# Patient Record
Sex: FEMALE | Race: WHITE | NOT HISPANIC OR LATINO | Employment: FULL TIME | URBAN - METROPOLITAN AREA
[De-identification: names, ages, dates, MRNs, and addresses within clinical notes are randomized per-mention and may not be internally consistent; named-entity substitution may affect disease eponyms.]

---

## 2019-02-05 ENCOUNTER — HOSPITAL ENCOUNTER (EMERGENCY)
Facility: HOSPITAL | Age: 39
Discharge: HOME | End: 2019-02-05
Attending: EMERGENCY MEDICINE
Payer: OTHER MISCELLANEOUS

## 2019-02-05 VITALS
HEIGHT: 61 IN | BODY MASS INDEX: 32.1 KG/M2 | SYSTOLIC BLOOD PRESSURE: 122 MMHG | DIASTOLIC BLOOD PRESSURE: 69 MMHG | TEMPERATURE: 99 F | HEART RATE: 84 BPM | RESPIRATION RATE: 12 BRPM | OXYGEN SATURATION: 98 % | WEIGHT: 170 LBS

## 2019-02-05 DIAGNOSIS — S69.91XA HAND INJURY, RIGHT, INITIAL ENCOUNTER: ICD-10-CM

## 2019-02-05 DIAGNOSIS — T14.8XXA CONTUSION OF BONE: Primary | ICD-10-CM

## 2019-02-05 PROCEDURE — 99283 EMERGENCY DEPT VISIT LOW MDM: CPT | Mod: 25

## 2019-02-05 PROCEDURE — 25000003 PHARM REV CODE 250: Performed by: NURSE PRACTITIONER

## 2019-02-05 PROCEDURE — 29125 APPL SHORT ARM SPLINT STATIC: CPT | Mod: RT

## 2019-02-05 RX ORDER — DICLOFENAC SODIUM 50 MG/1
50 TABLET, DELAYED RELEASE ORAL EVERY 8 HOURS PRN
Qty: 30 TABLET | Refills: 0 | Status: SHIPPED | OUTPATIENT
Start: 2019-02-05 | End: 2022-09-01

## 2019-02-05 RX ORDER — LORATADINE 10 MG/1
10 TABLET ORAL DAILY
COMMUNITY

## 2019-02-05 RX ORDER — IBUPROFEN 400 MG/1
800 TABLET ORAL
Status: COMPLETED | OUTPATIENT
Start: 2019-02-05 | End: 2019-02-05

## 2019-02-05 RX ORDER — CITALOPRAM 40 MG/1
40 TABLET, FILM COATED ORAL DAILY
COMMUNITY
End: 2022-09-01

## 2019-02-05 RX ADMIN — IBUPROFEN 800 MG: 400 TABLET, FILM COATED ORAL at 09:02

## 2019-02-05 NOTE — ED PROVIDER NOTES
Encounter Date: 2/5/2019       History     Chief Complaint   Patient presents with    Hand Injury     Smashed R hand between metal cart and a shelf yesterday, continues with pain, edema.     Dakota Scanlon is a 38 year old female presenting to the ER with c/o right hand pain and swelling. She states that she slammed her hand between a cart and metal shelf yesterday. She has had pain and swelling since that time and now reports significant pain with movement of the 2-4 digits. She has taken ibuprofen for pain and was instructed to come here by her employer.           Review of patient's allergies indicates:   Allergen Reactions    Amoxicillin     Sulfa (sulfonamide antibiotics)      History reviewed. No pertinent past medical history.  History reviewed. No pertinent surgical history.  History reviewed. No pertinent family history.  Social History     Tobacco Use    Smoking status: Current Every Day Smoker     Packs/day: 1.00     Types: Cigarettes   Substance Use Topics    Alcohol use: Not on file    Drug use: Not on file     Review of Systems   Constitutional: Negative for chills and fever.   HENT: Negative for congestion, rhinorrhea and sore throat.    Eyes: Negative for pain and redness.   Respiratory: Negative for cough and shortness of breath.    Cardiovascular: Negative for chest pain and palpitations.   Gastrointestinal: Negative for abdominal pain, diarrhea and nausea.   Genitourinary: Negative for dysuria, flank pain, frequency, hematuria and urgency.   Musculoskeletal: Positive for arthralgias and joint swelling. Negative for gait problem, myalgias and neck pain.   Skin: Negative for rash.   Neurological: Negative for dizziness, light-headedness and headaches.       Physical Exam     Initial Vitals [02/05/19 0848]   BP Pulse Resp Temp SpO2   122/69 84 12 98.6 °F (37 °C) 98 %      MAP       --         Physical Exam    Constitutional: She appears well-developed and well-nourished. She is not diaphoretic.  She is active. She does not have a sickly appearance. No distress.   HENT:   Head: Normocephalic and atraumatic.   Eyes: Conjunctivae are normal.   Neck: Normal range of motion and full passive range of motion without pain.   Cardiovascular: Normal rate, regular rhythm and normal heart sounds. Exam reveals no gallop and no friction rub.    No murmur heard.  Pulmonary/Chest: Breath sounds normal. She has no wheezes. She has no rhonchi. She has no rales.   Abdominal: Soft. Bowel sounds are normal. There is no tenderness.   Musculoskeletal: Normal range of motion.        Hands:  Tenderness and swelling to right 2-4 MCP joints. Decreased ROM due to pain. Sensation intact to light touch. 5/5 strength in digits   Neurological: She is alert. Gait normal.   Skin: Skin is warm and dry. Capillary refill takes less than 2 seconds.   Psychiatric: She has a normal mood and affect.         ED Course   Procedures  Labs Reviewed - No data to display       Imaging Results    None                APC / Resident Notes:   Dakota Scanlon is a 38 year old presenting after crush injury to right hand. Decreased ROM due to pain but patient is neurovascularly intact. No evidence of fracture or dislocation on xray per independent interpretation and radiology read. Patient placed in velcro wrist splint by nursing staff for patient comfort. Referred to orthopedics for follow up and clearance to return to work. Based on my clinical evaluation, I do not appreciate any immediate, emergent, or life threatening condition or etiology that warrants additional workup today and feel that the patient can be discharged with close follow up care.                 ED Course as of Feb 05 0923   Tue Feb 05, 2019   0918 XR R hand:  No fx or dislocation. (my read)  [MR]      ED Course User Index  [MR] Nabeel Charles MD     Clinical Impression:   The primary encounter diagnosis was Contusion of bone. A diagnosis of Hand injury, right, initial encounter was  also pertinent to this visit.      Disposition:   Disposition: Discharged  Condition: Stable                        Lisbeth Church NP  02/05/19 1219

## 2019-02-18 ENCOUNTER — OFFICE VISIT (OUTPATIENT)
Dept: ORTHOPEDICS | Facility: CLINIC | Age: 39
End: 2019-02-18
Payer: OTHER MISCELLANEOUS

## 2019-02-18 VITALS
HEART RATE: 94 BPM | HEIGHT: 61 IN | SYSTOLIC BLOOD PRESSURE: 107 MMHG | DIASTOLIC BLOOD PRESSURE: 66 MMHG | WEIGHT: 170 LBS | BODY MASS INDEX: 32.1 KG/M2

## 2019-02-18 DIAGNOSIS — S60.221A CONTUSION OF RIGHT HAND, INITIAL ENCOUNTER: Primary | ICD-10-CM

## 2019-02-18 PROCEDURE — 99999 PR PBB SHADOW E&M-EST. PATIENT-LVL III: ICD-10-PCS | Mod: PBBFAC,,, | Performed by: ORTHOPAEDIC SURGERY

## 2019-02-18 PROCEDURE — 99999 PR PBB SHADOW E&M-EST. PATIENT-LVL III: CPT | Mod: PBBFAC,,, | Performed by: ORTHOPAEDIC SURGERY

## 2019-02-18 PROCEDURE — 99243 OFF/OP CNSLTJ NEW/EST LOW 30: CPT | Mod: S$GLB,,, | Performed by: ORTHOPAEDIC SURGERY

## 2019-02-18 PROCEDURE — 99243 PR OFFICE CONSULTATION,LEVEL III: ICD-10-PCS | Mod: S$GLB,,, | Performed by: ORTHOPAEDIC SURGERY

## 2019-02-18 NOTE — LETTER
February 18, 2019      Nabeel Charles MD  [address] [place] [address] [address] [place] [id number]           [facility] [place] [address]  [place] [address]  Phone: [phone]          Patient: Dakota Scanlon   MR Number: [id number]   YOB: 1980   Date of Visit: 2/18/2019       Dear Dr. Nabeel Charles:    Thank you for referring Dakota Scanlon to me for evaluation. Attached you will find relevant portions of my assessment and plan of care.    If you have questions, please do not hesitate to call me. I look forward to following Dakota Scanlon along with you.    Sincerely,    Felix Shirley MD    Enclosure  CC:  No Recipients    If you would like to receive this communication electronically, please contact [email or web address] or [phone] to request more information on [employer] access.    For providers and/or their staff who would like to refer a patient to [facility], please contact us through our one-stop-shop provider referral line, Radha Macedo, at [phone].    If you feel you have received this communication in error or would no longer like to receive these types of communications, please e-mail [email or web address]

## 2019-02-18 NOTE — PROGRESS NOTES
History reviewed. No pertinent past medical history.    History reviewed. No pertinent surgical history.    Current Outpatient Medications   Medication Sig    citalopram (CELEXA) 40 MG tablet Take 40 mg by mouth once daily.    diclofenac (VOLTAREN) 50 MG EC tablet Take 1 tablet (50 mg total) by mouth every 8 (eight) hours as needed.    loratadine (CLARITIN) 10 mg tablet Take 10 mg by mouth once daily.     No current facility-administered medications for this visit.        Review of patient's allergies indicates:   Allergen Reactions    Amoxicillin     Sulfa (sulfonamide antibiotics)        History reviewed. No pertinent family history.    Social History     Socioeconomic History    Marital status:      Spouse name: Not on file    Number of children: Not on file    Years of education: Not on file    Highest education level: Not on file   Social Needs    Financial resource strain: Not on file    Food insecurity - worry: Not on file    Food insecurity - inability: Not on file    Transportation needs - medical: Not on file    Transportation needs - non-medical: Not on file   Occupational History    Not on file   Tobacco Use    Smoking status: Current Every Day Smoker     Packs/day: 1.00     Types: Cigarettes    Smokeless tobacco: Never Used   Substance and Sexual Activity    Alcohol use: Not on file    Drug use: Not on file    Sexual activity: Not on file   Other Topics Concern    Not on file   Social History Narrative    Not on file       Chief Complaint:   Chief Complaint   Patient presents with    Right Hand - Pain       History of present illness:  This is a 38-year-old female seen for worker's compensation injury.  Patient smashed her right hand between a cart in a shelf on approximately February 4, 2018.  Patient was seen at the emergency room. X-rays were negative.  Diagnosed with a hand contusion.  Pain is much improved.  Symptoms are mild.  Pain is a 1/10.      Review of  Systems:    Constitution: Negative for chills, fever, and sweats.  Negative for unexplained weight loss.    HENT:  Negative for headaches and blurry vision.    Cardiovascular:Negative for chest pain or irregular heart beat. Negative for hypertension.    Respiratory:  Negative for cough and shortness of breath.    Gastrointestinal: Negative for abdominal pain, heartburn, melena, nausea, and vomitting.    Genitourinary:  Negative bladder incontinence and dysuria.    Musculoskeletal:  See HPI    Neurological: Negative for numbness.    Psychiatric/Behavioral: Negative for depression.  The patient is not nervous/anxious.      Endocrine: Negative for polyuria    Hematologic/Lymphatic: Negative for bleeding problem.  Does not bruise/bleed easily.    Skin: Negative for poor would healing and rash      Physical Examination:    Vital Signs:    Vitals:    02/18/19 1346   BP: 107/66   Pulse: 94       Body mass index is 32.12 kg/m².    This a well-developed, well nourished patient in no acute distress.  They are alert and oriented and cooperative to examination.  Pt. walks without an antalgic gait.      Examination of bilateral hands and wrist shows no signs of rashes or erythema. Patient has no masses ecchymosis or effusions. Patient has full range of motion of the wrist in flexion and extension as well as ulnar and radial deviation. The patient also has full range of motion of all joints in the hand. There are 2+ radial pulse and intact light touch sensation in all 5 digits. Nontender over the anatomic snuffbox.     X-rays:  X-ray of the right wrist is available for review which show no acute fractures     Assessment::  Right hand contusion    Plan:  I reviewed the finding with her today.  Patient's x-rays are normal. Symptoms are much improved.  We will release her back to work without restrictions.  No follow-up needed. Discussed with her expectations about occasional soreness and achiness for months.    This note was  created using [employer] voice recognition software that occasionally misinterpreted phrases or words.    Consult note is delivered via Epic messaging service.

## 2019-05-21 ENCOUNTER — HOSPITAL ENCOUNTER (EMERGENCY)
Facility: HOSPITAL | Age: 39
Discharge: HOME | End: 2019-05-21
Attending: EMERGENCY MEDICINE
Payer: COMMERCIAL

## 2019-05-21 ENCOUNTER — TELEPHONE (OUTPATIENT)
Dept: CARDIOLOGY | Facility: CLINIC | Age: 39
End: 2019-05-21

## 2019-05-21 VITALS
TEMPERATURE: 98 F | SYSTOLIC BLOOD PRESSURE: 107 MMHG | OXYGEN SATURATION: 100 % | WEIGHT: 170 LBS | RESPIRATION RATE: 16 BRPM | HEIGHT: 61 IN | HEART RATE: 98 BPM | BODY MASS INDEX: 32.1 KG/M2 | DIASTOLIC BLOOD PRESSURE: 58 MMHG

## 2019-05-21 DIAGNOSIS — R55 VASOVAGAL SYNCOPE: Primary | ICD-10-CM

## 2019-05-21 DIAGNOSIS — R55 SYNCOPE: ICD-10-CM

## 2019-05-21 DIAGNOSIS — R05.9 COUGH: ICD-10-CM

## 2019-05-21 LAB
ALBUMIN SERPL BCP-MCNC: 4.2 G/DL (ref 3.5–5.2)
ALP SERPL-CCNC: 70 U/L (ref 55–135)
ALT SERPL W/O P-5'-P-CCNC: 17 U/L (ref 10–44)
ANION GAP SERPL CALC-SCNC: 10 MMOL/L (ref 8–16)
AST SERPL-CCNC: 15 U/L (ref 10–40)
BASOPHILS # BLD AUTO: 0 K/UL (ref 0–0.2)
BASOPHILS NFR BLD: 0.3 % (ref 0–1.9)
BILIRUB SERPL-MCNC: 0.4 MG/DL (ref 0.1–1)
BUN SERPL-MCNC: 15 MG/DL (ref 6–20)
CALCIUM SERPL-MCNC: 10.5 MG/DL (ref 8.7–10.5)
CHLORIDE SERPL-SCNC: 105 MMOL/L (ref 95–110)
CO2 SERPL-SCNC: 22 MMOL/L (ref 23–29)
CREAT SERPL-MCNC: 0.9 MG/DL (ref 0.5–1.4)
D DIMER PPP IA.FEU-MCNC: 0.29 MG/L FEU
DIFFERENTIAL METHOD: ABNORMAL
EOSINOPHIL # BLD AUTO: 0 K/UL (ref 0–0.5)
EOSINOPHIL NFR BLD: 0.2 % (ref 0–8)
ERYTHROCYTE [DISTWIDTH] IN BLOOD BY AUTOMATED COUNT: 13.2 % (ref 11.5–14.5)
EST. GFR  (AFRICAN AMERICAN): >60 ML/MIN/1.73 M^2
EST. GFR  (NON AFRICAN AMERICAN): >60 ML/MIN/1.73 M^2
GLUCOSE SERPL-MCNC: 91 MG/DL (ref 70–110)
HCT VFR BLD AUTO: 41.5 % (ref 37–48.5)
HGB BLD-MCNC: 14 G/DL (ref 12–16)
LYMPHOCYTES # BLD AUTO: 0.6 K/UL (ref 1–4.8)
LYMPHOCYTES NFR BLD: 8.2 % (ref 18–48)
MCH RBC QN AUTO: 31 PG (ref 27–31)
MCHC RBC AUTO-ENTMCNC: 33.8 G/DL (ref 32–36)
MCV RBC AUTO: 92 FL (ref 82–98)
MONOCYTES # BLD AUTO: 0.5 K/UL (ref 0.3–1)
MONOCYTES NFR BLD: 5.8 % (ref 4–15)
NEUTROPHILS # BLD AUTO: 6.7 K/UL (ref 1.8–7.7)
NEUTROPHILS NFR BLD: 85.5 % (ref 38–73)
PLATELET # BLD AUTO: 287 K/UL (ref 150–350)
PMV BLD AUTO: 7.2 FL (ref 9.2–12.9)
POTASSIUM SERPL-SCNC: 4.3 MMOL/L (ref 3.5–5.1)
PROT SERPL-MCNC: 7.5 G/DL (ref 6–8.4)
RBC # BLD AUTO: 4.52 M/UL (ref 4–5.4)
SODIUM SERPL-SCNC: 137 MMOL/L (ref 136–145)
TROPONIN I SERPL DL<=0.01 NG/ML-MCNC: <0.006 NG/ML (ref 0–0.03)
WBC # BLD AUTO: 7.9 K/UL (ref 3.9–12.7)

## 2019-05-21 PROCEDURE — 25000242 PHARM REV CODE 250 ALT 637 W/ HCPCS: Performed by: EMERGENCY MEDICINE

## 2019-05-21 PROCEDURE — 85379 FIBRIN DEGRADATION QUANT: CPT

## 2019-05-21 PROCEDURE — 94640 AIRWAY INHALATION TREATMENT: CPT

## 2019-05-21 PROCEDURE — 80053 COMPREHEN METABOLIC PANEL: CPT

## 2019-05-21 PROCEDURE — 99285 EMERGENCY DEPT VISIT HI MDM: CPT | Mod: 25

## 2019-05-21 PROCEDURE — 93005 ELECTROCARDIOGRAM TRACING: CPT

## 2019-05-21 PROCEDURE — 25000003 PHARM REV CODE 250: Performed by: EMERGENCY MEDICINE

## 2019-05-21 PROCEDURE — 96360 HYDRATION IV INFUSION INIT: CPT

## 2019-05-21 PROCEDURE — 84484 ASSAY OF TROPONIN QUANT: CPT

## 2019-05-21 PROCEDURE — 85025 COMPLETE CBC W/AUTO DIFF WBC: CPT

## 2019-05-21 PROCEDURE — 36415 COLL VENOUS BLD VENIPUNCTURE: CPT

## 2019-05-21 RX ORDER — PREDNISONE 10 MG/1
10 TABLET ORAL DAILY
COMMUNITY
End: 2022-09-01

## 2019-05-21 RX ORDER — HYDROCODONE POLISTIREX AND CHLORPHENIRAMINE POLISTIREX 10; 8 MG/5ML; MG/5ML
5 SUSPENSION, EXTENDED RELEASE ORAL EVERY 12 HOURS PRN
Qty: 115 ML | Refills: 0 | Status: SHIPPED | OUTPATIENT
Start: 2019-05-21 | End: 2019-05-31

## 2019-05-21 RX ORDER — AZITHROMYCIN 500 MG/1
500 TABLET, FILM COATED ORAL DAILY
COMMUNITY
End: 2019-05-21

## 2019-05-21 RX ORDER — IPRATROPIUM BROMIDE AND ALBUTEROL SULFATE 2.5; .5 MG/3ML; MG/3ML
3 SOLUTION RESPIRATORY (INHALATION) EVERY 6 HOURS PRN
Qty: 25 VIAL | Refills: 2 | Status: SHIPPED | OUTPATIENT
Start: 2019-05-21 | End: 2023-02-07

## 2019-05-21 RX ORDER — SODIUM CHLORIDE 9 MG/ML
1000 INJECTION, SOLUTION INTRAVENOUS
Status: COMPLETED | OUTPATIENT
Start: 2019-05-21 | End: 2019-05-21

## 2019-05-21 RX ORDER — IPRATROPIUM BROMIDE AND ALBUTEROL SULFATE 2.5; .5 MG/3ML; MG/3ML
3 SOLUTION RESPIRATORY (INHALATION)
Status: COMPLETED | OUTPATIENT
Start: 2019-05-21 | End: 2019-05-21

## 2019-05-21 RX ADMIN — SODIUM CHLORIDE 1000 ML: 0.9 INJECTION, SOLUTION INTRAVENOUS at 11:05

## 2019-05-21 RX ADMIN — IPRATROPIUM BROMIDE AND ALBUTEROL SULFATE 3 ML: .5; 3 SOLUTION RESPIRATORY (INHALATION) at 11:05

## 2019-05-21 NOTE — ED PROVIDER NOTES
"Encounter Date: 5/21/2019    SCRIBE #1 NOTE: IJohn, am scribing for, and in the presence of, Dr. Ron Gilmore.   SCRIBE #2 NOTE: IIgnacio, am scribing for, and in the presence of, Dr. Gilmore. the EKG reading.     History     Chief Complaint   Patient presents with    Loss of Consciousness     episodes of " passing out " seen 3 times at [facility] / presently on prednisone / z pack        Time seen by provider: 10:48 AM on 05/21/2019    Dakota Scanlon is a 38 y.o. female with PMHx of Asthma who presents to the ED for evaluation after loss of consciousness. Pt states "every time she coughs real hard she hits the ground". She says these "passing out episodes" began Saturday, x3 days PTA with the most frequent episode taking place this morning. She complains of dizziness after passing out this morning. She also currently complains of SOB, headache, intermittent palpitations, right sided pain from the fall and weakness. Pt states she has low grade fever and is unsure if she's had a fever in the past few days. She also complains of diarrhea due to side effect from Amoxicillin she's been taking for a possible sinus infection. Pt confirms smoking one pack per 1.5 days.The patient denies congestion or any other symptoms at this time. No PSHx noted. Allergic to Amoxicillin and Sulfa Antibiotics.    The history is provided by the patient.     Review of patient's allergies indicates:   Allergen Reactions    Amoxicillin     Sulfa (sulfonamide antibiotics)      History reviewed. No pertinent past medical history.  History reviewed. No pertinent surgical history.  History reviewed. No pertinent family history.  Social History     Tobacco Use    Smoking status: Current Every Day Smoker     Packs/day: 1.00     Types: Cigarettes    Smokeless tobacco: Never Used   Substance Use Topics    Alcohol use: Not on file    Drug use: Not on file     Review of Systems   Constitutional: Negative for activity change and " fever.   HENT: Negative for congestion.    Respiratory: Positive for cough and shortness of breath.    Cardiovascular: Positive for palpitations (Intermittent ). Negative for leg swelling.   Gastrointestinal: Positive for diarrhea.   Genitourinary: Negative for flank pain.   Musculoskeletal: Positive for myalgias (Right sided pain due to fall). Negative for gait problem.   Neurological: Positive for dizziness (Resolved), syncope, weakness and headaches. Negative for speech difficulty.   Psychiatric/Behavioral: Negative for confusion.       Physical Exam     Initial Vitals [05/21/19 1038]   BP Pulse Resp Temp SpO2   (!) 107/58 90 18 98 °F (36.7 °C) 98 %      MAP       --         Physical Exam    Nursing note and vitals reviewed.  Constitutional: She appears well-developed and well-nourished.   HENT:   Head: Normocephalic and atraumatic.   Eyes: Conjunctivae are normal. Pupils are equal, round, and reactive to light.   Neck: Normal range of motion. Neck supple.   Cardiovascular: Normal rate, regular rhythm, normal heart sounds and normal pulses. Exam reveals no gallop and no friction rub.    No murmur heard.  Pulmonary/Chest: Effort normal and breath sounds normal. No respiratory distress. She has no wheezes. She has no rhonchi. She has no rales.   Equal, bilateral breath sounds noted without wheezing.    Abdominal: Soft. Normal appearance. She exhibits no distension. There is no tenderness.   Musculoskeletal: Normal range of motion.   Neurological: She is alert and oriented to person, place, and time.   Cranial nerves II through XII grossly intact. 5/5 motor strength to all 4 extremities. Sensation is intact. Finger-to-nose intact. Speech and cognition is normal. No focal neurologic deficit.   Skin: Skin is warm and dry. No erythema.   Psychiatric: Her mood appears anxious.         ED Course   Procedures  Labs Reviewed   COMPREHENSIVE METABOLIC PANEL - Abnormal; Notable for the following components:       Result  Value    CO2 22 (*)     All other components within normal limits   CBC W/ AUTO DIFFERENTIAL - Abnormal; Notable for the following components:    MPV 7.2 (*)     Lymph # 0.6 (*)     Gran% 85.5 (*)     Lymph% 8.2 (*)     All other components within normal limits   D DIMER, QUANTITATIVE   TROPONIN I     EKG Readings: (Independently Interpreted)   NSR @ 71 BPM with normal axis, normal intervals, and normal ST and T waves.       Imaging Results          X-Ray Chest PA And Lateral (Final result)  Result time 05/21/19 11:10:32    Final result by Nabeel Holder MD (05/21/19 11:10:32)                 Narrative:    EXAMINATION:  XR CHEST PA AND LATERAL    CLINICAL HISTORY:  Cough    TECHNIQUE:  PA and lateral views of the chest were performed.    COMPARISON:  None    FINDINGS:  Lungs are clear.Normal cardiomediastinal silhouette.Normal pulmonary vascular distribution.No pleural effusion or pneumothorax.No acute osseous abnormality.      Electronically signed by: Nabeel Holder  Date:    05/21/2019  Time:    11:10                               Medical Decision Making:   History:   Old Medical Records: I decided to obtain old medical records.  Independently Interpreted Test(s):   I have ordered and independently interpreted EKG Reading(s) - see summary below  Clinical Tests:   Lab Tests: Ordered and Reviewed  Radiological Study: Reviewed and Ordered  Medical Tests: Ordered and Reviewed  ED Management:  38-year-old female smoker with a history of COPD presents with a 3 day history of increased coughing associated with syncopal episodes during coughing.  She has no chest pain, palpitations.  EKG is normal with no dysrhythmia or evidence of ischemia.  Symptoms are suggestive of vasovagal syncope.  She is encouraged to discontinue all smoking and is given a prescription for DuoNeb.  She is encouraged to follow up with Cardiology for consideration of prolonged monitoring and possible provocative testing.       APC / Resident  Notes:   I, Dr. Ron Gilmore III, personally performed the services described in this documentation. All medical record entries made by the scribe were at my direction and in my presence.  I have reviewed the chart and agree that the record reflects my personal performance and is accurate and complete       Scribe Attestation:   Scribe #1: I performed the above scribed service and the documentation accurately describes the services I performed. I attest to the accuracy of the note.               Clinical Impression:       ICD-10-CM ICD-9-CM   1. Vasovagal syncope R55 780.2   2. Syncope R55 780.2   3. Cough R05 786.2         Disposition:   Disposition: Discharged  Condition: Stable                        Ron Gilmore III, MD  05/21/19 0022

## 2019-05-21 NOTE — TELEPHONE ENCOUNTER
----- Message from Keri Bar sent at 5/21/2019  4:00 PM CDT -----    Type:  Same Day Appointment Request    Caller is requesting a same day appointment.  Caller declined first available appointment listed below.      Name of Callerpt  When is the first available appointment?  june  Symptoms:    Hospital  Follow up /  diag     Heart  Skips  Beats  And  Pt  faints  Best Call Back Number: [phone]

## 2019-05-21 NOTE — TELEPHONE ENCOUNTER
Call placed to Ms. Scanlon in regards to message received. No answer, left message to return call.

## 2019-05-21 NOTE — TELEPHONE ENCOUNTER
Spoke with pt and she would not accept an appointment past tomorrow and did not want to be placed on a waiting list.

## 2019-05-21 NOTE — ED NOTES
Pt awake alert in no acute distress, pt reports LOC twice this morning, pt reports SOB denies chest pain or abd pain, denies n/v/d family at beside

## 2019-05-21 NOTE — TELEPHONE ENCOUNTER
----- Message from Denys Macias sent at 5/21/2019  2:52 PM CDT -----  Type:  Sooner Apoointment Request    Caller is requesting a sooner appointment.  Caller declined first available appointment listed below.  Caller will not accept being placed on the waitlist and is requesting a message be sent to doctor.    Name of Caller:  Self   When is the first available appointment?  07/03/2019  Symptoms:  Best Call Back Number:  [phone]  Additional Information:  Patient was seen at [facility],patient requesting to be seen earlier.

## 2020-01-17 RX ORDER — TIZANIDINE 4 MG/1
4 TABLET ORAL EVERY 8 HOURS
COMMUNITY

## 2020-01-17 RX ORDER — OMEPRAZOLE 40 MG/1
40 CAPSULE, DELAYED RELEASE ORAL EVERY MORNING
COMMUNITY

## 2020-01-17 RX ORDER — AMOXICILLIN 500 MG/1
500 CAPSULE ORAL
COMMUNITY
Start: 2019-05-07 | End: 2022-09-01

## 2020-01-20 ENCOUNTER — INITIAL CONSULT (OUTPATIENT)
Dept: PULMONOLOGY | Facility: CLINIC | Age: 40
End: 2020-01-20
Payer: COMMERCIAL

## 2020-01-20 VITALS
OXYGEN SATURATION: 97 % | HEIGHT: 61 IN | HEART RATE: 81 BPM | DIASTOLIC BLOOD PRESSURE: 69 MMHG | BODY MASS INDEX: 32.85 KG/M2 | SYSTOLIC BLOOD PRESSURE: 113 MMHG | WEIGHT: 174 LBS

## 2020-01-20 DIAGNOSIS — R53.83 FATIGUE, UNSPECIFIED TYPE: ICD-10-CM

## 2020-01-20 DIAGNOSIS — R06.00 DYSPNEA, UNSPECIFIED TYPE: Primary | ICD-10-CM

## 2020-01-20 DIAGNOSIS — Z72.0 TOBACCO ABUSE: ICD-10-CM

## 2020-01-20 PROCEDURE — 99244 PR OFFICE CONSULTATION,LEVEL IV: ICD-10-PCS | Mod: S$GLB,,, | Performed by: NURSE PRACTITIONER

## 2020-01-20 PROCEDURE — 99244 OFF/OP CNSLTJ NEW/EST MOD 40: CPT | Mod: S$GLB,,, | Performed by: NURSE PRACTITIONER

## 2020-01-20 RX ORDER — METOPROLOL SUCCINATE 50 MG/1
50 TABLET, EXTENDED RELEASE ORAL DAILY
COMMUNITY
Start: 2019-12-20

## 2020-01-20 RX ORDER — MIDODRINE HYDROCHLORIDE 5 MG/1
TABLET ORAL
COMMUNITY
Start: 2019-11-16 | End: 2022-11-01

## 2020-01-20 RX ORDER — COLCHICINE 0.6 MG/1
0.6 TABLET ORAL DAILY PRN
COMMUNITY
Start: 2020-01-14 | End: 2022-09-01

## 2020-01-20 RX ORDER — MIDODRINE HYDROCHLORIDE 10 MG/1
10 TABLET ORAL DAILY
COMMUNITY
Start: 2020-01-09

## 2020-01-20 RX ORDER — INDOMETHACIN 25 MG/1
CAPSULE ORAL
COMMUNITY
Start: 2020-01-14 | End: 2022-09-01

## 2020-01-20 NOTE — PROGRESS NOTES
SUBJECTIVE:    Patient ID: Dakota Scanlon is a 39 y.o. female.    Chief Complaint: Apnea (possible)    HPI     Patient here referred here today by Dr. Castro to be evaluated for sleep apnea.  She states she sleeps all day and all night. She lives tired.  She has headaches that come and go, decreased libido.  She states she wakes herself up talking in middle of night. She has a history of asthma.  She is trying to quit smoking down to a pack every 3-4 days.  She has been smoking since age of 13.    Past Medical History:   Diagnosis Date    Arthritis     Asthma     Nervous disorder      History reviewed. No pertinent surgical history.  Family History   Problem Relation Age of Onset    Hypertension Father     Diabetes Father     Heart disease Sister         Social History:   Marital Status:   Occupation:works at [employer]   Alcohol History:  reports that she drank alcohol.  Tobacco History:  reports that she has been smoking cigarettes. She has been smoking about 1.00 pack per day. She has never used smokeless tobacco.  Drug History:  reports that she does not use drugs.    Review of patient's allergies indicates:   Allergen Reactions    Amoxicillin     Sulfa (sulfonamide antibiotics)        Current Outpatient Medications   Medication Sig Dispense Refill    albuterol-ipratropium (DUO-NEB) 2.5 mg-0.5 mg/3 mL nebulizer solution Take 3 mLs by nebulization every 6 (six) hours as needed for Wheezing. 25 vial 2    citalopram (CELEXA) 40 MG tablet Take 40 mg by mouth once daily.      colchicine (COLCRYS) 0.6 mg tablet       indomethacin (INDOCIN) 25 MG capsule       loratadine (CLARITIN) 10 mg tablet Take 10 mg by mouth once daily.      metoprolol succinate (TOPROL-XL) 25 MG 24 hr tablet       midodrine (PROAMATINE) 10 MG tablet       midodrine (PROAMATINE) 5 MG Tab       amoxicillin (AMOXIL) 500 MG capsule Take 500 mg by mouth.      diclofenac (VOLTAREN) 50 MG EC tablet Take 1  "tablet (50 mg total) by mouth every 8 (eight) hours as needed. (Patient not taking: Reported on 1/20/2020) 30 tablet 0    omeprazole (PRILOSEC) 40 MG capsule Take 40 mg by mouth.      predniSONE (DELTASONE) 10 MG tablet Take 10 mg by mouth once daily.      tiZANidine (ZANAFLEX) 4 MG tablet Take 4 mg by mouth.       No current facility-administered medications for this visit.            Review of Systems  General: always fatigued, can sleep all day and all night   Eyes: Vision is good.  ENT:  No sinusitis or pharyngitis.   Heart:: chest pain and palpitations   Lungs: No cough, dyspnea varies sometimes with little exertion sometimes doing nothing  GI: GERD  : No dysuria, hesitancy, or nocturia.  Musculoskeletal: wrist hurt   Skin: No lesions or rashes.  Neuro: headaches   Lymph: legs swell when she is on her feet all day  Psych: No anxiety or depression.  Endo: weight gain of 15 pounds     OBJECTIVE:      /69 (BP Location: Right arm, Patient Position: Sitting, BP Method: Medium (Automatic))   Pulse 81   Ht 5' 1" (1.549 m)   Wt 78.9 kg (174 lb)   SpO2 97%   BMI 32.88 kg/m²     Physical Exam  GENERAL: Older patient in no distress.  HEENT: Pupils equal and reactive. Extraocular movements intact. Nose intact.    malampatti 2   Pharynx moist. Poor dentation   NECK: Supple. 15.5 inches   HEART: Regular rate and rhythm. No murmur or gallop auscultated.  LUNGS: Clear to auscultation and percussion. Lung excursion symmetrical. No change in fremitus. No adventitial noises.  ABDOMEN: Bowel sounds present. Non-tender, no masses palpated.  EXTREMITIES: Normal muscle tone and joint movement, no cyanosis or clubbing.   LYMPHATICS: No adenopathy palpated, no edema.  SKIN: Dry, intact, no lesions.   NEURO: Cranial nerves II-XII intact. Motor strength 5/5 bilaterally, upper and lower extremities.  PSYCH: Appropriate affect.    Assessment:       1. Dyspnea, unspecified type    2. Fatigue, unspecified type    3. Tobacco " abuse          Plan:       Dyspnea, unspecified type  -     Complete PFT with bronchodilator; Future    Fatigue, unspecified type  -     Home Sleep Studies; Future; Expected date: 01/20/2020    Tobacco abuse       stop smoking  PFT  Home sleep study     Follow up in about 6 weeks (around 3/2/2020).

## 2020-01-20 NOTE — PATIENT INSTRUCTIONS
How to Quit Smoking  Smoking is one of the hardest habits to break. About half of all people who have ever smoked have been able to quit. Most people who still smoke want to quit. Here are some of the best ways to stop smoking.    Keep trying  Most smokers make many attempts at quitting before they are successful. Its important not to give up.  Go cold turkey  Most former smokers quit cold turkey (all at once). Trying to cut back gradually doesn't seem to work as well, perhaps because it continues the smoking habit. Also, it is possible to inhale more while smoking fewer cigarettes. This results in the same amount of nicotine in your body.  Get support  Support programs can be a big help, especially for heavy smokers. These groups offer lectures, ways to change behavior, and peer support. Here are some ways to find a support program:  · Free national quitline: 800-QUIT-NOW (982-325-0797).  · Hospital quit-smoking programs.  · American Lung Association: (911.796.3228).  · American Cancer Society (607-906-0170).  Support at home is important too. Nonsmokers can offer praise and encouragement. If the smoker in your life finds it hard to quit, encourage them to keep trying.  Over-the-counter medicines  Nicotine replacement therapy may make quitting easier. Certain aids, such as the nicotine patch, gum, and lozenges, are available without a prescription. It is best to use these under a doctors care, though. The skin patch provides a steady supply of nicotine. Nicotine gum and lozenges give temporary bursts of low levels of nicotine. Both methods reduce the craving for cigarettes. Warning: If you have nausea, vomiting, dizziness, weakness, or a fast heartbeat, stop using these products and see your doctor.  Prescription medicines  After reviewing your smoking patterns and past attempts to quit, your doctor may offer a prescription medicine such as bupropion, varenicline, a nicotine inhaler, or nasal spray. Each has  "advantages and side effects. Your doctor can review these with you.  Health benefits of quitting  The benefits of quitting start right away and keep improving the longer you go without smoking. These benefits occur at any age.  So whether you are 17 or 70, quitting is a good decision. Some of the benefits include:  · 20 minutes: Blood pressure and pulse return to normal.  · 8 hours: Oxygen levels return to normal.  · 2 days: Ability to smell and taste begin to improve as damaged nerves regrow.  · 2 to 3 weeks: Circulation and lung function improve.  · 1 to 9 months: Coughing, congestion, and shortness of breath decrease; tiredness decreases.  · 1 year: Risk of heart attack decreases by half.  · 5 years: Risk of lung cancer decreases by half; risk of stroke becomes the same as a nonsmokers.  For more on how to quit smoking, try these online resources:   · Smokefree.gov  · "Clearing the Air" booklet from the National Cancer [place]: smokefree.gov/sites/default/files/pdf/clearing-the-air-accessible.pdf  Date Last Reviewed: 3/1/2017  © 1742-0713 [employer]. [address] [place] [address]. All rights reserved. This information is not intended as a substitute for professional medical care. Always follow your healthcare professional's instructions.     stop smoking  PFT  Home sleep study     "

## 2020-01-30 ENCOUNTER — HOSPITAL ENCOUNTER (OUTPATIENT)
Dept: PULMONOLOGY | Facility: HOSPITAL | Age: 40
Discharge: HOME | End: 2020-01-30
Attending: NURSE PRACTITIONER
Payer: COMMERCIAL

## 2020-01-30 ENCOUNTER — TELEPHONE (OUTPATIENT)
Dept: PULMONOLOGY | Facility: CLINIC | Age: 40
End: 2020-01-30

## 2020-01-30 DIAGNOSIS — R06.00 DYSPNEA, UNSPECIFIED TYPE: ICD-10-CM

## 2020-01-30 PROCEDURE — 94729 DIFFUSING CAPACITY: CPT

## 2020-01-30 PROCEDURE — 94010 BREATHING CAPACITY TEST: CPT

## 2020-01-30 PROCEDURE — 94727 GAS DIL/WSHOT DETER LNG VOL: CPT

## 2020-01-30 NOTE — TELEPHONE ENCOUNTER
PFT shows no obstruction with no change with bronchodilator, mild restriction, no diffusion defect.

## 2020-02-19 ENCOUNTER — PROCEDURE VISIT (OUTPATIENT)
Dept: SLEEP MEDICINE | Facility: HOSPITAL | Age: 40
End: 2020-02-19
Attending: NURSE PRACTITIONER
Payer: COMMERCIAL

## 2020-02-19 DIAGNOSIS — R53.83 FATIGUE, UNSPECIFIED TYPE: ICD-10-CM

## 2020-02-19 PROCEDURE — 95806 SLEEP STUDY UNATT&RESP EFFT: CPT

## 2020-02-28 ENCOUNTER — TELEPHONE (OUTPATIENT)
Dept: PULMONOLOGY | Facility: HOSPITAL | Age: 40
End: 2020-02-28

## 2020-02-28 DIAGNOSIS — G47.33 OSA (OBSTRUCTIVE SLEEP APNEA): Primary | ICD-10-CM

## 2020-02-28 RX ORDER — SELENIUM SULFIDE 2.5 MG/100ML
LOTION TOPICAL
COMMUNITY
Start: 2020-02-03 | End: 2022-09-01

## 2020-02-28 RX ORDER — BUSPIRONE HYDROCHLORIDE 10 MG/1
10 TABLET ORAL 3 TIMES DAILY
COMMUNITY
Start: 2020-02-03 | End: 2022-10-05 | Stop reason: SDUPTHER

## 2020-02-28 RX ORDER — KETOCONAZOLE 20 MG/ML
SHAMPOO, SUSPENSION TOPICAL
COMMUNITY
Start: 2020-02-03 | End: 2022-09-01

## 2020-02-28 RX ORDER — HYDROCODONE BITARTRATE AND ACETAMINOPHEN 7.5; 325 MG/1; MG/1
TABLET ORAL
COMMUNITY
Start: 2020-02-26 | End: 2022-09-01

## 2020-02-28 RX ORDER — CLINDAMYCIN HYDROCHLORIDE 150 MG/1
CAPSULE ORAL
COMMUNITY
Start: 2020-02-26 | End: 2022-09-01

## 2020-02-28 NOTE — TELEPHONE ENCOUNTER
The patient has ENA on HST.  Will order a CPAP titration given the degree of hypoxemia for this HST.

## 2020-03-02 ENCOUNTER — TELEPHONE (OUTPATIENT)
Dept: PULMONOLOGY | Facility: CLINIC | Age: 40
End: 2020-03-02

## 2020-03-02 ENCOUNTER — OFFICE VISIT (OUTPATIENT)
Dept: PULMONOLOGY | Facility: CLINIC | Age: 40
End: 2020-03-02
Payer: COMMERCIAL

## 2020-03-02 ENCOUNTER — HOSPITAL ENCOUNTER (OUTPATIENT)
Dept: RADIOLOGY | Facility: HOSPITAL | Age: 40
Discharge: HOME | End: 2020-03-02
Attending: NURSE PRACTITIONER
Payer: COMMERCIAL

## 2020-03-02 VITALS
HEART RATE: 86 BPM | DIASTOLIC BLOOD PRESSURE: 80 MMHG | BODY MASS INDEX: 32.5 KG/M2 | SYSTOLIC BLOOD PRESSURE: 120 MMHG | WEIGHT: 172 LBS | OXYGEN SATURATION: 96 %

## 2020-03-02 DIAGNOSIS — R05.9 COUGH: ICD-10-CM

## 2020-03-02 DIAGNOSIS — Z72.0 TOBACCO ABUSE: Primary | ICD-10-CM

## 2020-03-02 DIAGNOSIS — G47.33 OSA (OBSTRUCTIVE SLEEP APNEA): ICD-10-CM

## 2020-03-02 PROCEDURE — 99214 PR OFFICE/OUTPT VISIT, EST, LEVL IV, 30-39 MIN: ICD-10-PCS | Mod: S$GLB,,, | Performed by: NURSE PRACTITIONER

## 2020-03-02 PROCEDURE — 99214 OFFICE O/P EST MOD 30 MIN: CPT | Mod: S$GLB,,, | Performed by: NURSE PRACTITIONER

## 2020-03-02 PROCEDURE — 71046 X-RAY EXAM CHEST 2 VIEWS: CPT | Mod: TC

## 2020-03-02 RX ORDER — DOXYCYCLINE HYCLATE 100 MG
100 TABLET ORAL 2 TIMES DAILY
Qty: 14 TABLET | Refills: 0 | Status: SHIPPED | OUTPATIENT
Start: 2020-03-02 | End: 2022-09-01

## 2020-03-02 NOTE — PATIENT INSTRUCTIONS
How to Quit Smoking  Smoking is one of the hardest habits to break. About half of all people who have ever smoked have been able to quit. Most people who still smoke want to quit. Here are some of the best ways to stop smoking.    Keep trying  Most smokers make many attempts at quitting before they are successful. Its important not to give up.  Go cold turkey  Most former smokers quit cold turkey (all at once). Trying to cut back gradually doesn't seem to work as well, perhaps because it continues the smoking habit. Also, it is possible to inhale more while smoking fewer cigarettes. This results in the same amount of nicotine in your body.  Get support  Support programs can be a big help, especially for heavy smokers. These groups offer lectures, ways to change behavior, and peer support. Here are some ways to find a support program:  · Free national quitline: 800-QUIT-NOW (741-198-3522).  · Hospital quit-smoking programs.  · American Lung Association: (371.512.6582).  · American Cancer Society (925-102-8125).  Support at home is important too. Nonsmokers can offer praise and encouragement. If the smoker in your life finds it hard to quit, encourage them to keep trying.  Over-the-counter medicines  Nicotine replacement therapy may make quitting easier. Certain aids, such as the nicotine patch, gum, and lozenges, are available without a prescription. It is best to use these under a doctors care, though. The skin patch provides a steady supply of nicotine. Nicotine gum and lozenges give temporary bursts of low levels of nicotine. Both methods reduce the craving for cigarettes. Warning: If you have nausea, vomiting, dizziness, weakness, or a fast heartbeat, stop using these products and see your doctor.  Prescription medicines  After reviewing your smoking patterns and past attempts to quit, your doctor may offer a prescription medicine such as bupropion, varenicline, a nicotine inhaler, or nasal spray. Each has  "advantages and side effects. Your doctor can review these with you.  Health benefits of quitting  The benefits of quitting start right away and keep improving the longer you go without smoking. These benefits occur at any age.  So whether you are 17 or 70, quitting is a good decision. Some of the benefits include:  · 20 minutes: Blood pressure and pulse return to normal.  · 8 hours: Oxygen levels return to normal.  · 2 days: Ability to smell and taste begin to improve as damaged nerves regrow.  · 2 to 3 weeks: Circulation and lung function improve.  · 1 to 9 months: Coughing, congestion, and shortness of breath decrease; tiredness decreases.  · 1 year: Risk of heart attack decreases by half.  · 5 years: Risk of lung cancer decreases by half; risk of stroke becomes the same as a nonsmokers.  For more on how to quit smoking, try these online resources:   · [employer] [place]  · "Clearing the Air" booklet from the National Cancer [place]: Night Up.gov/sites/default/files/pdf/clearing-the-air-accessible.pdf  Date Last Reviewed: 3/1/2017  © 9563-5858 [employer]. [address] [place] [address]. All rights reserved. This information is not intended as a substitute for professional medical care. Always follow your healthcare professional's instructions.        How to Quit Smoking  Smoking is one of the hardest habits to break. About half of all people who have ever smoked have been able to quit. Most people who still smoke want to quit. Here are some of the best ways to stop smoking.    Keep trying  Most smokers make many attempts at quitting before they are successful. Its important not to give up.  Go cold turkey  Most former smokers quit cold turkey (all at once). Trying to cut back gradually doesn't seem to work as well, perhaps because it continues the smoking habit. Also, it is possible to inhale more while smoking fewer cigarettes. This results in the same amount of nicotine in your " body.  Get support  Support programs can be a big help, especially for heavy smokers. These groups offer lectures, ways to change behavior, and peer support. Here are some ways to find a support program:  · Free national quitline: 800-QUIT-NOW (790-136-1179).  · Hospital quit-smoking programs.  · American Lung Association: (534.799.3018).  · American Cancer Society (016-325-7747).  Support at home is important too. Nonsmokers can offer praise and encouragement. If the smoker in your life finds it hard to quit, encourage them to keep trying.  Over-the-counter medicines  Nicotine replacement therapy may make quitting easier. Certain aids, such as the nicotine patch, gum, and lozenges, are available without a prescription. It is best to use these under a doctors care, though. The skin patch provides a steady supply of nicotine. Nicotine gum and lozenges give temporary bursts of low levels of nicotine. Both methods reduce the craving for cigarettes. Warning: If you have nausea, vomiting, dizziness, weakness, or a fast heartbeat, stop using these products and see your doctor.  Prescription medicines  After reviewing your smoking patterns and past attempts to quit, your doctor may offer a prescription medicine such as bupropion, varenicline, a nicotine inhaler, or nasal spray. Each has advantages and side effects. Your doctor can review these with you.  Health benefits of quitting  The benefits of quitting start right away and keep improving the longer you go without smoking. These benefits occur at any age.  So whether you are 17 or 70, quitting is a good decision. Some of the benefits include:  · 20 minutes: Blood pressure and pulse return to normal.  · 8 hours: Oxygen levels return to normal.  · 2 days: Ability to smell and taste begin to improve as damaged nerves regrow.  · 2 to 3 weeks: Circulation and lung function improve.  · 1 to 9 months: Coughing, congestion, and shortness of breath decrease; tiredness  "decreases.  · 1 year: Risk of heart attack decreases by half.  · 5 years: Risk of lung cancer decreases by half; risk of stroke becomes the same as a nonsmokers.  For more on how to quit smoking, try these online resources:   · Smokefree.gov  · "Clearing the Air" booklet from the National Cancer [place]: smokefree.gov/sites/default/files/pdf/clearing-the-air-accessible.pdf  Date Last Reviewed: 3/1/2017  © 7312-6904 [employer]. [address] [place], [place] [address]. All rights reserved. This information is not intended as a substitute for professional medical care. Always follow your healthcare professional's instructions.        Obstructive Sleep Apnea  Obstructive sleep apnea is a condition that causes your air passages to become narrowed or blocked during sleep. As a result, breathing stops for short periods. Your body wakes up enough for breathing to begin again, though you don't remember it. The cycle of stopped breathing and brief awakenings can repeat dozens of times a night. This prevents the body from getting to the deeper stages of sleep that are needed for good rest and may cause your body's oxygen level to fall.  Signs of sleep apnea include loud snoring, noisy breathing, and gasping sounds during sleep. Daytime symptoms include waking up tired after a full night's sleep, waking up with headaches, feeling very sleepy or falling asleep during the day, and having problems with memory or concentration.  Risk factors for sleep apnea include:  · Being overweight  · Being a man, or a woman in menopause  · Smoking  · Using alcohol or sedating medicines  · Having enlarged structures in the nose or throat  Home care  Lifestyle changes that can help treat snoring and sleep apnea include the following:  · If you are overweight, lose weight. Talk to your healthcare provider about a weight-loss plan for you.  · Avoid alcohol for 3 to 4 hours before bedtime. Avoid sedating medications. Ask your " healthcare provider about the medicines you take.  · If you smoke, talk to your healthcare provider about ways to quit.  · Sleep on your side. This can help prevent gravity from pulling relaxed throat tissues into your breathing passages.  · If you have allergies or sinus problems that block your nose, ask your healthcare provider for help.  Follow-up care  Follow up with your healthcare provider, or as advised. A diagnosis of sleep apnea is made with a sleep study. Your healthcare provider can tell you more about this test.  When to seek medical advice  Sleep apnea can make you more likely to have certain health problems. These include high blood pressure, heart attack, stroke, and sexual dysfunction. If you have sleep apnea, talk to your healthcare provider about the best treatments for you.  Date Last Reviewed: 4/1/2017  © 0655-2645 [employer]. [address] [place] [address], [place] [address]. All rights reserved. This information is not intended as a substitute for professional medical care. Always follow your healthcare professional's instructions.      stop smoking  Doxycycline 100mg by mouth twice a day for a week  Eat with the antibiotic, wear sun block and take a probiotic daily  Start back on over the counter Prilosec daily  Call me if the sleep lab does not call you by end of week   Chest xray

## 2020-03-02 NOTE — PROGRESS NOTES
SUBJECTIVE:    Patient ID: Dakota Scanlon is a 39 y.o. female.    Chief Complaint: Results (sleep test an pft patient was unaware that she had to do a cpap titration )    HPI     Patient here today with complaints of a cough.  She states that her allergies have been really bad.  Her cough is worse at night, occasional green mucous production for the past 3 weeks.  She is still smoking 1/2 pack of cigarettes a day.  She is taking Claritin daily. She complains of reflux but does not take medication for it. Her PFT showed no obstruction, mild restriction, and a normal diffusion capacity.  She is taking Clindamycin for a dental infection from having teeth pulled.  Her sleep study showed ENA with hypoxemia, an inlab CPAP titration study was ordered.    Past Medical History:   Diagnosis Date    Arthritis     Asthma     Nervous disorder      No past surgical history on file.  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  Tobacco History:  reports that she has been smoking cigarettes. She has been smoking about 1.00 pack per day. She has never used smokeless tobacco.  Drug History:  reports that she does not use drugs.    Review of patient's allergies indicates:   Allergen Reactions    Amoxicillin     Sulfa (sulfonamide antibiotics)        Current Outpatient Medications   Medication Sig Dispense Refill    albuterol-ipratropium (DUO-NEB) 2.5 mg-0.5 mg/3 mL nebulizer solution Take 3 mLs by nebulization every 6 (six) hours as needed for Wheezing. 25 vial 2    busPIRone (BUSPAR) 10 MG tablet       citalopram (CELEXA) 40 MG tablet Take 40 mg by mouth once daily.      clindamycin (CLEOCIN) 150 MG capsule       ketoconazole (NIZORAL) 2 % shampoo       loratadine (CLARITIN) 10 mg tablet Take 10 mg  by mouth once daily.      metoprolol succinate (TOPROL-XL) 25 MG 24 hr tablet       midodrine (PROAMATINE) 10 MG tablet       selenium sulfide 2.5 % Lotn       amoxicillin (AMOXIL) 500 MG capsule Take 500 mg by mouth.      colchicine (COLCRYS) 0.6 mg tablet       diclofenac (VOLTAREN) 50 MG EC tablet Take 1 tablet (50 mg total) by mouth every 8 (eight) hours as needed. (Patient not taking: Reported on 1/20/2020) 30 tablet 0    doxycycline (VIBRA-TABS) 100 MG tablet Take 1 tablet (100 mg total) by mouth 2 (two) times daily. 14 tablet 0    HYDROcodone-acetaminophen (NORCO) 7.5-325 mg per tablet       indomethacin (INDOCIN) 25 MG capsule       midodrine (PROAMATINE) 5 MG Tab       omeprazole (PRILOSEC) 40 MG capsule Take 40 mg by mouth.      predniSONE (DELTASONE) 10 MG tablet Take 10 mg by mouth once daily.      tiZANidine (ZANAFLEX) 4 MG tablet Take 4 mg by mouth.       No current facility-administered medications for this visit.            Review of Systems  General: no fever,  Fatigue   Eyes: Vision is good.  ENT: had 3 teeth pulled got infected doing better   Heart:: chest pain and palpitations at times   Lungs: cough with green mucous production for 3 weeks  GI: GERD  : No dysuria, hesitancy, or nocturia.  Musculoskeletal: wrist hurt   Skin: allergic rash from dryer sheets   Neuro: headaches   Lymph: legs swell when she is on her feet all day  Psych: No anxiety or depression.  Endo: trying to lose weight     OBJECTIVE:      /80 (BP Location: Left arm, Patient Position: Sitting)   Pulse 86   Wt 78 kg (172 lb)   SpO2 96%   BMI 32.50 kg/m²     Physical Exam  GENERAL: Older patient in no distress.  HEENT: Pupils equal and reactive. Extraocular movements intact. Nose intact.     Pharynx moist. Poor dentation   NECK: Supple.   HEART: Regular rate and rhythm. No murmur or gallop auscultated.  LUNGS: Clear to auscultation and percussion. Lung excursion symmetrical. No change in fremitus. No  adventitial noises.  ABDOMEN: Bowel sounds present. Non-tender, no masses palpated.  EXTREMITIES: Normal muscle tone and joint movement, no cyanosis or clubbing.   LYMPHATICS: No adenopathy palpated, no edema.  SKIN: Dry, intact, no lesions.   NEURO: Cranial nerves II-XII intact. Motor strength 5/5 bilaterally, upper and lower extremities.  PSYCH: Appropriate affect.    Assessment:       1. Tobacco abuse    2. ENA (obstructive sleep apnea)    3. Cough          Plan:       Tobacco abuse    ENA (obstructive sleep apnea)    Cough  -     X-Ray Chest PA And Lateral; Future    Other orders  -     doxycycline (VIBRA-TABS) 100 MG tablet; Take 1 tablet (100 mg total) by mouth 2 (two) times daily.  Dispense: 14 tablet; Refill: 0       stop smoking  Doxycycline 100mg by mouth twice a day for a week  Eat with the antibiotic, wear sun block and take a probiotic daily  Start back on over the counter Prilosec daily  Call me if the sleep lab does not call you by end of week   Chest xray   Follow up in about 3 months (around 6/2/2020).

## 2020-05-05 ENCOUNTER — TELEPHONE (OUTPATIENT)
Dept: PULMONOLOGY | Facility: CLINIC | Age: 40
End: 2020-05-05

## 2020-05-05 DIAGNOSIS — Z01.818 PREOP EXAMINATION: Primary | ICD-10-CM

## 2020-05-12 ENCOUNTER — DOCUMENTATION ONLY (OUTPATIENT)
Dept: PULMONOLOGY | Facility: CLINIC | Age: 40
End: 2020-05-12

## 2020-05-12 NOTE — PROGRESS NOTES
It is my medical opinion this procedure is time sensitive. Further delay of this procedure could result in further detriment to the patient including: death from MI or CVA.  Discussed the requirement for patient to comply with strict social distancing measures from the time of this evaluation through the day of procedure. Patient understands and agrees to comply with this requirement.

## 2020-06-12 ENCOUNTER — HOSPITAL ENCOUNTER (OUTPATIENT)
Dept: PREADMISSION TESTING | Facility: HOSPITAL | Age: 40
Discharge: HOME | End: 2020-06-12
Attending: NURSE PRACTITIONER
Payer: COMMERCIAL

## 2020-06-12 DIAGNOSIS — Z01.818 PREOP EXAMINATION: ICD-10-CM

## 2020-06-12 PROCEDURE — U0003 INFECTIOUS AGENT DETECTION BY NUCLEIC ACID (DNA OR RNA); SEVERE ACUTE RESPIRATORY SYNDROME CORONAVIRUS 2 (SARS-COV-2) (CORONAVIRUS DISEASE [COVID-19]), AMPLIFIED PROBE TECHNIQUE, MAKING USE OF HIGH THROUGHPUT TECHNOLOGIES AS DESCRIBED BY CMS-2020-01-R: HCPCS

## 2020-06-13 LAB — SARS-COV-2 RNA RESP QL NAA+PROBE: NOT DETECTED

## 2020-06-15 ENCOUNTER — PROCEDURE VISIT (OUTPATIENT)
Dept: SLEEP MEDICINE | Facility: HOSPITAL | Age: 40
End: 2020-06-15
Attending: INTERNAL MEDICINE
Payer: COMMERCIAL

## 2020-06-15 DIAGNOSIS — G47.33 OSA (OBSTRUCTIVE SLEEP APNEA): ICD-10-CM

## 2020-06-15 PROCEDURE — 95811 POLYSOM 6/>YRS CPAP 4/> PARM: CPT

## 2020-06-17 ENCOUNTER — TELEPHONE (OUTPATIENT)
Dept: PULMONOLOGY | Facility: CLINIC | Age: 40
End: 2020-06-17

## 2020-06-17 DIAGNOSIS — G47.33 OSA (OBSTRUCTIVE SLEEP APNEA): Primary | ICD-10-CM

## 2020-06-17 NOTE — TELEPHONE ENCOUNTER
The CPAP titration shows she does well with 9cm CPAP with a small Resmed F20 Air Fit full face mask and heated humidity.  tried to reach patient.  No answer.  Could not leave a message.

## 2020-12-22 ENCOUNTER — HOSPITAL ENCOUNTER (EMERGENCY)
Facility: HOSPITAL | Age: 40
Discharge: HOME | End: 2020-12-22
Attending: EMERGENCY MEDICINE
Payer: COMMERCIAL

## 2020-12-22 VITALS
BODY MASS INDEX: 32.66 KG/M2 | WEIGHT: 173 LBS | SYSTOLIC BLOOD PRESSURE: 105 MMHG | RESPIRATION RATE: 18 BRPM | TEMPERATURE: 98 F | HEIGHT: 61 IN | OXYGEN SATURATION: 99 % | DIASTOLIC BLOOD PRESSURE: 67 MMHG | HEART RATE: 66 BPM

## 2020-12-22 DIAGNOSIS — R07.9 CHEST PAIN: Primary | ICD-10-CM

## 2020-12-22 LAB
ALBUMIN SERPL BCP-MCNC: 4.7 G/DL (ref 3.5–5.2)
ALP SERPL-CCNC: 75 U/L (ref 55–135)
ALT SERPL W/O P-5'-P-CCNC: 15 U/L (ref 10–44)
ANION GAP SERPL CALC-SCNC: 13 MMOL/L (ref 8–16)
AST SERPL-CCNC: 20 U/L (ref 10–40)
BASOPHILS # BLD AUTO: 0.04 K/UL (ref 0–0.2)
BASOPHILS NFR BLD: 1 % (ref 0–1.9)
BILIRUB SERPL-MCNC: 0.8 MG/DL (ref 0.1–1)
BILIRUB UR QL STRIP: NEGATIVE
BNP SERPL-MCNC: 60 PG/ML (ref 0–99)
BUN SERPL-MCNC: 12 MG/DL (ref 6–20)
CALCIUM SERPL-MCNC: 9.2 MG/DL (ref 8.7–10.5)
CHLORIDE SERPL-SCNC: 105 MMOL/L (ref 95–110)
CLARITY UR: CLEAR
CO2 SERPL-SCNC: 20 MMOL/L (ref 23–29)
COLOR UR: YELLOW
CREAT SERPL-MCNC: 0.8 MG/DL (ref 0.5–1.4)
DIFFERENTIAL METHOD: ABNORMAL
EOSINOPHIL # BLD AUTO: 0.1 K/UL (ref 0–0.5)
EOSINOPHIL NFR BLD: 2.9 % (ref 0–8)
ERYTHROCYTE [DISTWIDTH] IN BLOOD BY AUTOMATED COUNT: 12.7 % (ref 11.5–14.5)
EST. GFR  (AFRICAN AMERICAN): >60 ML/MIN/1.73 M^2
EST. GFR  (NON AFRICAN AMERICAN): >60 ML/MIN/1.73 M^2
GLUCOSE SERPL-MCNC: 89 MG/DL (ref 70–110)
GLUCOSE UR QL STRIP: NEGATIVE
HCT VFR BLD AUTO: 41 % (ref 37–48.5)
HGB BLD-MCNC: 13.7 G/DL (ref 12–16)
HGB UR QL STRIP: ABNORMAL
IMM GRANULOCYTES # BLD AUTO: 0.01 K/UL (ref 0–0.04)
IMM GRANULOCYTES NFR BLD AUTO: 0.2 % (ref 0–0.5)
KETONES UR QL STRIP: NEGATIVE
LEUKOCYTE ESTERASE UR QL STRIP: NEGATIVE
LYMPHOCYTES # BLD AUTO: 1.1 K/UL (ref 1–4.8)
LYMPHOCYTES NFR BLD: 25.8 % (ref 18–48)
MCH RBC QN AUTO: 31.1 PG (ref 27–31)
MCHC RBC AUTO-ENTMCNC: 33.4 G/DL (ref 32–36)
MCV RBC AUTO: 93 FL (ref 82–98)
MONOCYTES # BLD AUTO: 0.3 K/UL (ref 0.3–1)
MONOCYTES NFR BLD: 7.7 % (ref 4–15)
NEUTROPHILS # BLD AUTO: 2.6 K/UL (ref 1.8–7.7)
NEUTROPHILS NFR BLD: 62.4 % (ref 38–73)
NITRITE UR QL STRIP: NEGATIVE
NRBC BLD-RTO: 0 /100 WBC
PH UR STRIP: 8 [PH] (ref 5–8)
PLATELET # BLD AUTO: 276 K/UL (ref 150–350)
PMV BLD AUTO: 10 FL (ref 9.2–12.9)
POTASSIUM SERPL-SCNC: 3.2 MMOL/L (ref 3.5–5.1)
PROT SERPL-MCNC: 7.4 G/DL (ref 6–8.4)
PROT UR QL STRIP: NEGATIVE
RBC # BLD AUTO: 4.4 M/UL (ref 4–5.4)
SARS-COV-2 RDRP RESP QL NAA+PROBE: NEGATIVE
SODIUM SERPL-SCNC: 138 MMOL/L (ref 136–145)
SP GR UR STRIP: 1 (ref 1–1.03)
TROPONIN I SERPL DL<=0.01 NG/ML-MCNC: <0.03 NG/ML
TROPONIN I SERPL DL<=0.01 NG/ML-MCNC: <0.03 NG/ML
URN SPEC COLLECT METH UR: ABNORMAL
UROBILINOGEN UR STRIP-ACNC: NEGATIVE EU/DL
WBC # BLD AUTO: 4.15 K/UL (ref 3.9–12.7)

## 2020-12-22 PROCEDURE — 36415 COLL VENOUS BLD VENIPUNCTURE: CPT

## 2020-12-22 PROCEDURE — 93010 EKG 12-LEAD: ICD-10-PCS | Mod: 76,,, | Performed by: INTERNAL MEDICINE

## 2020-12-22 PROCEDURE — 93005 ELECTROCARDIOGRAM TRACING: CPT | Performed by: INTERNAL MEDICINE

## 2020-12-22 PROCEDURE — 84484 ASSAY OF TROPONIN QUANT: CPT

## 2020-12-22 PROCEDURE — 81003 URINALYSIS AUTO W/O SCOPE: CPT

## 2020-12-22 PROCEDURE — 99285 EMERGENCY DEPT VISIT HI MDM: CPT | Mod: 25

## 2020-12-22 PROCEDURE — 25000003 PHARM REV CODE 250: Performed by: EMERGENCY MEDICINE

## 2020-12-22 PROCEDURE — U0002 COVID-19 LAB TEST NON-CDC: HCPCS

## 2020-12-22 PROCEDURE — 93010 ELECTROCARDIOGRAM REPORT: CPT | Mod: ,,, | Performed by: INTERNAL MEDICINE

## 2020-12-22 PROCEDURE — 83880 ASSAY OF NATRIURETIC PEPTIDE: CPT

## 2020-12-22 PROCEDURE — 85025 COMPLETE CBC W/AUTO DIFF WBC: CPT

## 2020-12-22 PROCEDURE — 80053 COMPREHEN METABOLIC PANEL: CPT

## 2020-12-22 RX ORDER — ASPIRIN 325 MG
325 TABLET ORAL
Status: COMPLETED | OUTPATIENT
Start: 2020-12-22 | End: 2020-12-22

## 2020-12-22 RX ADMIN — NITROGLYCERIN 1 INCH: 20 OINTMENT TOPICAL at 01:12

## 2020-12-22 RX ADMIN — ASPIRIN 325 MG ORAL TABLET 325 MG: 325 PILL ORAL at 01:12

## 2020-12-22 NOTE — ED PROVIDER NOTES
Encounter Date: 12/22/2020       History     Chief Complaint   Patient presents with    Chest Pain     Pt reports symptoms began around 1100. CP, SOB, Palpitations and left arm numbness. Hx of CHF.     Palpitations    Shortness of Breath     This is a 40-year-old female past medical history of asthma, anxiety who presents emergency department chest pain.  She says that she has anterior chest pain started at 11:00 a.m. today.  She had some mild associated shortness of breath.  She says that she had some numbness and tingling to her left arm.  Nothing seems to make it better or worse.  Not associated with any nausea or vomiting.  She does report some stress and anxiety at work.  She has never had any issues with her heart in the past other than retention of fluid.  She does not have any known coronary artery disease.  She does not have any history of diabetes, hypertension, or hyperlipidemia.        Review of patient's allergies indicates:   Allergen Reactions    Amoxicillin     Sulfa (sulfonamide antibiotics)      Past Medical History:   Diagnosis Date    Arthritis     Asthma     Nervous disorder      No past surgical history on file.  Family History   Problem Relation Age of Onset    Hypertension Father     Diabetes Father     Heart disease Sister      Social History     Tobacco Use    Smoking status: Current Every Day Smoker     Packs/day: 1.00     Types: Cigarettes    Smokeless tobacco: Never Used    Tobacco comment: on the patch at this time but still smoking 4-5 cigs a day   Substance Use Topics    Alcohol use: Yes     Comment: occasionaly     Drug use: Never     Review of Systems   Constitutional: Negative for chills and fever.   HENT: Negative for sore throat.    Respiratory: Negative for shortness of breath.    Cardiovascular: Positive for chest pain and palpitations.   Gastrointestinal: Negative for nausea.   Genitourinary: Negative for dysuria and flank pain.   Musculoskeletal: Negative  for back pain.   Skin: Negative for rash.   Neurological: Negative for weakness.   Hematological: Does not bruise/bleed easily.   All other systems reviewed and are negative.      Physical Exam     Initial Vitals [12/22/20 1228]   BP Pulse Resp Temp SpO2   117/60 63 18 97.9 °F (36.6 °C) 98 %      MAP       --         Physical Exam    Nursing note and vitals reviewed.  Constitutional: She appears well-developed and well-nourished.   HENT:   Head: Normocephalic and atraumatic.   Mouth/Throat: No oropharyngeal exudate.   Eyes: Conjunctivae and EOM are normal. Pupils are equal, round, and reactive to light.   Neck: Neck supple. No tracheal deviation present.   Cardiovascular: Normal rate, regular rhythm, normal heart sounds and intact distal pulses.   No murmur heard.  Pulmonary/Chest: Breath sounds normal. No stridor. No respiratory distress. She has no wheezes. She has no rhonchi. She has no rales. She exhibits no tenderness.   Abdominal: Soft. She exhibits no distension. There is no abdominal tenderness. There is no rebound and no guarding.   Musculoskeletal: Normal range of motion. No tenderness or edema.   Neurological: She is alert and oriented to person, place, and time. She has normal strength. No cranial nerve deficit or sensory deficit.   Skin: Skin is warm and dry. Capillary refill takes less than 2 seconds. No rash noted. No erythema. No pallor.   Psychiatric: She has a normal mood and affect. Her behavior is normal. Judgment and thought content normal.         ED Course   Procedures  Labs Reviewed   CBC W/ AUTO DIFFERENTIAL - Abnormal; Notable for the following components:       Result Value    MCH 31.1 (*)     All other components within normal limits   COMPREHENSIVE METABOLIC PANEL - Abnormal; Notable for the following components:    Potassium 3.2 (*)     CO2 20 (*)     All other components within normal limits   URINALYSIS - Abnormal; Notable for the following components:    Occult Blood UA Trace (*)      All other components within normal limits   TROPONIN I   B-TYPE NATRIURETIC PEPTIDE   SARS-COV-2 RNA AMPLIFICATION, QUAL   TROPONIN I        ECG Results          EKG 12-lead (In process)  Result time 12/22/20 16:19:39    In process by Interface, Lab In [facility] (12/22/20 16:19:39)                 Narrative:    Test Reason : R07.9,    Vent. Rate : 055 BPM     Atrial Rate : 055 BPM     P-R Int : 160 ms          QRS Dur : 092 ms      QT Int : 478 ms       P-R-T Axes : 033 049 013 degrees     QTc Int : 457 ms    Sinus bradycardia with marked sinus arrythmia  Otherwise normal ECG  When compared with ECG of 22-DEC-2020 12:31,  No significant change was found    Referred By: [email or web address]   SELF           Confirmed By:                              EKG 12-lead (In process)  Result time 12/22/20 13:36:44    In process by Interface, Lab In [facility] (12/22/20 13:36:44)                 Narrative:    Test Reason : R07.9,    Vent. Rate : 061 BPM     Atrial Rate : 061 BPM     P-R Int : 154 ms          QRS Dur : 072 ms      QT Int : 466 ms       P-R-T Axes : 053 050 023 degrees     QTc Int : 469 ms    Sinus rhythm with marked sinus arrythmia  Otherwise normal ECG  When compared with ECG of 21-MAY-2019 11:01,  QT has lengthened    Referred By: [email or web address]   SELF           Confirmed By:                             Imaging Results          X-Ray Chest PA And Lateral (Final result)  Result time 12/22/20 12:56:46    Final result by James Narvaez MD (12/22/20 12:56:46)                 Narrative:    CLINICAL HISTORY:  40 years (1980) Female Chest Pain; Palpitations; Shortness of  Breath    TECHNIQUE:  PA and lateral radiograph of the chest.    COMPARISON:  Most recent radiograph from March 2, 2020    FINDINGS:  The lungs are clear. Costophrenic angles are seen without effusion. No  pneumothorax is identified. The heart is normal in size, noting a  likely prominent epicardial fat pad along the right heart border,  unchanged. The  mediastinum is within normal limits. Osseous structures  appear within normal limits. The visualized upper abdomen is  unremarkable.    IMPRESSION:  No acute cardiac or pulmonary process.                  .            Electronically Signed by FRANCO Castanon on 12/22/2020 12:59 PM                            Results for orders placed or performed during the hospital encounter of 12/22/20   CBC auto differential   Result Value Ref Range    WBC 4.15 3.90 - 12.70 K/uL    RBC 4.40 4.00 - 5.40 M/uL    Hemoglobin 13.7 12.0 - 16.0 g/dL    Hematocrit 41.0 37.0 - 48.5 %    MCV 93 82 - 98 fL    MCH 31.1 (H) 27.0 - 31.0 pg    MCHC 33.4 32.0 - 36.0 g/dL    RDW 12.7 11.5 - 14.5 %    Platelets 276 150 - 350 K/uL    MPV 10.0 9.2 - 12.9 fL    Immature Granulocytes 0.2 0.0 - 0.5 %    Gran # (ANC) 2.6 1.8 - 7.7 K/uL    Immature Grans (Abs) 0.01 0.00 - 0.04 K/uL    Lymph # 1.1 1.0 - 4.8 K/uL    Mono # 0.3 0.3 - 1.0 K/uL    Eos # 0.1 0.0 - 0.5 K/uL    Baso # 0.04 0.00 - 0.20 K/uL    nRBC 0 0 /100 WBC    Gran % 62.4 38.0 - 73.0 %    Lymph % 25.8 18.0 - 48.0 %    Mono % 7.7 4.0 - 15.0 %    Eosinophil % 2.9 0.0 - 8.0 %    Basophil % 1.0 0.0 - 1.9 %    Differential Method Automated    Comprehensive metabolic panel   Result Value Ref Range    Sodium 138 136 - 145 mmol/L    Potassium 3.2 (L) 3.5 - 5.1 mmol/L    Chloride 105 95 - 110 mmol/L    CO2 20 (L) 23 - 29 mmol/L    Glucose 89 70 - 110 mg/dL    BUN 12 6 - 20 mg/dL    Creatinine 0.8 0.5 - 1.4 mg/dL    Calcium 9.2 8.7 - 10.5 mg/dL    Total Protein 7.4 6.0 - 8.4 g/dL    Albumin 4.7 3.5 - 5.2 g/dL    Total Bilirubin 0.8 0.1 - 1.0 mg/dL    Alkaline Phosphatase 75 55 - 135 U/L    AST 20 10 - 40 U/L    ALT 15 10 - 44 U/L    Anion Gap 13 8 - 16 mmol/L    eGFR if African American >60.0 >60 mL/min/1.73 m^2    eGFR if non African American >60.0 >60 mL/min/1.73 m^2   Troponin I   Result Value Ref Range    Troponin I <0.030 <=0.040 ng/mL   Brain natriuretic peptide   Result Value Ref Range     BNP 60 0 - 99 pg/mL   Urinalysis Clean Catch   Result Value Ref Range    Specimen UA Urine, Clean Catch     Color, UA Yellow Yellow, Straw, Tania    Appearance, UA Clear Clear    pH, UA 8.0 5.0 - 8.0    Specific Gravity, UA 1.005 1.005 - 1.030    Protein, UA Negative Negative    Glucose, UA Negative Negative    Ketones, UA Negative Negative    Bilirubin (UA) Negative Negative    Occult Blood UA Trace (A) Negative    Nitrite, UA Negative Negative    Urobilinogen, UA Negative Negative EU/dL    Leukocytes, UA Negative Negative   COVID-19 Rapid Screening   Result Value Ref Range    SARS-CoV-2 RNA, Amplification, Qual Negative Negative   Troponin I   Result Value Ref Range    Troponin I <0.030 <=0.040 ng/mL     X-Ray Chest PA And Lateral   Final Result             Medical Decision Making:   ED Management:  Patient presents emergency department chest pain as described above.  She is well-appearing, nontoxic no distress.  Overall impression is nonspecific chest pain.  I do not suspect ACS,( 2 troponins and 2 ekgs negative, Low risk HEART score) do not suspect Aortic Dissection ( pain is not ripping or tearing, normal pulses 4 extremities, no focal neuro deficits, normal mediastinum on CXR, low pretest probability, Non-Marfanoid) PE ( Low Wells score, low pretest probability) Esophageal rupture ( CXR normal, no Hammans crunch) Pneumonia (CXR normal, no cough, fever) Pericarditis with tamponade (VS normal, no JVD, hypotension, murmur, ekg within normal limits) Pneumothorax (Chest X ray negative).    I had a detailed discussion with the patient and/or guardian regarding: The historical points, exam findings, and diagnostic results supporting the discharge diagnosis, lab results, pertinent radiology results, and the need for outpatient follow-up, for definitive care with a cardiologist and to return to the emergency department if symptoms worsen or persist or if there are any questions or concerns that arise at home. All  questions have been answered in detail. Strict return to Emergency Department precautions have been provided.    A dictation software program was used for this note.  Please expect some simple typographical  errors in this note.     Additional MDM:   PERC Rule:   Age is greater than or equal to 50 = 0.0  Heart Rate is greater than or equal to 100 = 0.0  SaO2 on room air < 95% = 0.0  Unilateral leg swelling = 0.0  Hemoptysis = 0.0  Recent surgery or trauma = 0.0  Prior PE or DVT =  0.0  Hormone use = 0.00  PERC Score = 0  Heart Score:    History:          Moderately suspicious.  ECG:             Normal  Age:               Less than 45 years  Risk factors: 1-2 risk factors  Troponin:       Less than or equal to normal limit  Final Score: 2                    ED Course as of Dec 23 0629   Tue Dec 22, 2020   1508 EKG 12:31 p.m. normal sinus rhythm rate of 63.  Sinus arrhythmia noted.  No ST elevation or depression.  No STEMI.    [JR]   1511 Patient reassess, says she is feeling better.  She did admit to having some stress at work on stress situations at work he is wondering if this could be causing his symptoms.  She has cardiologist who can follow-up with her.    [JR]      ED Course User Index  [JR] Asif Quiles DO            Clinical Impression:       ICD-10-CM ICD-9-CM   1. Chest pain  R07.9 786.50                          ED Disposition Condition    Discharge Stable        ED Prescriptions     None        Follow-up Information     Follow up With Specialties Details Why Contact Info Additional Information    Sera Myles MD Family Medicine In 3 days  [address]  [address]  [facility] [address]  [phone]       [facility] Emergency Medicine  If symptoms worsen [address]  [facility] [id number] [phone] 1st floor    Austin Castro MD Cardiology, INTERVENTIONAL CARDIOLOGY In 3 days  [address] [facility]  2ND FLOOR  [facility]  [facility] [address]  [phone]                                           Asif Quiles, DO  12/23/20 0629

## 2020-12-22 NOTE — ED NOTES
Chief Complaint   Patient presents with    Chest Pain       Pt reports symptoms began around 1100. CP, SOB, Palpitations and left arm numbness. Hx of CHF.     Palpitations    Shortness of Breath      This is a 40-year-old female past medical history of asthma, anxiety who presents emergency department chest pain.  She says that she has anterior chest pain started at 11:00 a.m. today.  She had some mild associated shortness of breath.  She says that she had some numbness and tingling to her left arm.  Nothing seems to make it better or worse.  Not associated with any nausea or vomiting.  She does report some stress and anxiety at work.  She has never had any issues with her heart in the past other than retention of fluid.  She does not have any known coronary artery disease.  She does not have any history of diabetes, hypertension, or hyperlipidemia.

## 2020-12-22 NOTE — Clinical Note
"Dakota Oscarivette Scanlon was seen and treated in our emergency department on 12/22/2020.  She may return to work on 12/23/2020.       If you have any questions or concerns, please don't hesitate to call.       RN    "

## 2020-12-22 NOTE — DISCHARGE INSTRUCTIONS
RETURN TO EMERGENCY DEPARTMENT WITHOUT FAIL, IF YOUR SYMPTOMS WORSEN, IF YOU GET NEW OR DIFFERENT SYMPTOMS, IF YOU ARE UNABLE TO FOLLOW UP AS DIRECTED, OR IF YOU HAVE ANY CONCERNS OR WORRIES.    Follow-up with her cardiologist for further evaluation of her symptoms.

## 2021-12-21 DIAGNOSIS — I27.22 PULMONARY HYPERTENSION DUE TO LEFT HEART DISEASE: ICD-10-CM

## 2021-12-21 DIAGNOSIS — R06.09 PLATYPNEA-ORTHODEOXIA SYNDROME: Primary | ICD-10-CM

## 2022-01-05 ENCOUNTER — HOSPITAL ENCOUNTER (OUTPATIENT)
Dept: PULMONOLOGY | Facility: HOSPITAL | Age: 42
Discharge: HOME | End: 2022-01-05
Attending: INTERNAL MEDICINE
Payer: COMMERCIAL

## 2022-01-05 ENCOUNTER — HOSPITAL ENCOUNTER (OUTPATIENT)
Dept: RADIOLOGY | Facility: HOSPITAL | Age: 42
Discharge: HOME | End: 2022-01-05
Attending: INTERNAL MEDICINE
Payer: COMMERCIAL

## 2022-01-05 DIAGNOSIS — I27.22 PULMONARY HYPERTENSION DUE TO LEFT HEART DISEASE: ICD-10-CM

## 2022-01-05 DIAGNOSIS — R06.09 PLATYPNEA-ORTHODEOXIA SYNDROME: ICD-10-CM

## 2022-01-05 PROCEDURE — 94727 GAS DIL/WSHOT DETER LNG VOL: CPT

## 2022-01-05 PROCEDURE — 94729 DIFFUSING CAPACITY: CPT

## 2022-01-05 PROCEDURE — 94060 EVALUATION OF WHEEZING: CPT

## 2022-01-05 PROCEDURE — 71250 CT THORAX DX C-: CPT | Mod: TC

## 2022-01-05 PROCEDURE — 94010 BREATHING CAPACITY TEST: CPT

## 2022-04-04 ENCOUNTER — HOSPITAL ENCOUNTER (EMERGENCY)
Facility: HOSPITAL | Age: 42
Discharge: HOME | End: 2022-04-04
Attending: EMERGENCY MEDICINE
Payer: COMMERCIAL

## 2022-04-04 VITALS
HEIGHT: 61 IN | WEIGHT: 194 LBS | BODY MASS INDEX: 36.63 KG/M2 | TEMPERATURE: 99 F | SYSTOLIC BLOOD PRESSURE: 105 MMHG | DIASTOLIC BLOOD PRESSURE: 65 MMHG | HEART RATE: 74 BPM | RESPIRATION RATE: 17 BRPM | OXYGEN SATURATION: 95 %

## 2022-04-04 DIAGNOSIS — R00.2 PALPITATIONS: Primary | ICD-10-CM

## 2022-04-04 DIAGNOSIS — R07.9 CHEST PAIN: ICD-10-CM

## 2022-04-04 LAB
ALBUMIN SERPL BCP-MCNC: 4.5 G/DL (ref 3.5–5.2)
ALP SERPL-CCNC: 66 U/L (ref 55–135)
ALT SERPL W/O P-5'-P-CCNC: 15 U/L (ref 10–44)
ANION GAP SERPL CALC-SCNC: 13 MMOL/L (ref 8–16)
AST SERPL-CCNC: 27 U/L (ref 10–40)
B-HCG UR QL: NEGATIVE
BASOPHILS # BLD AUTO: 0.05 K/UL (ref 0–0.2)
BASOPHILS NFR BLD: 0.7 % (ref 0–1.9)
BILIRUB SERPL-MCNC: 1 MG/DL (ref 0.1–1)
BNP SERPL-MCNC: 23 PG/ML (ref 0–99)
BUN SERPL-MCNC: 15 MG/DL (ref 6–20)
CALCIUM SERPL-MCNC: 9.5 MG/DL (ref 8.7–10.5)
CHLORIDE SERPL-SCNC: 105 MMOL/L (ref 95–110)
CO2 SERPL-SCNC: 20 MMOL/L (ref 23–29)
CREAT SERPL-MCNC: 0.8 MG/DL (ref 0.5–1.4)
CTP QC/QA: YES
D DIMER PPP IA.FEU-MCNC: 0.46 UG/ML FEU
DIFFERENTIAL METHOD: ABNORMAL
EOSINOPHIL # BLD AUTO: 0.1 K/UL (ref 0–0.5)
EOSINOPHIL NFR BLD: 1.4 % (ref 0–8)
ERYTHROCYTE [DISTWIDTH] IN BLOOD BY AUTOMATED COUNT: 13.1 % (ref 11.5–14.5)
EST. GFR  (AFRICAN AMERICAN): >60 ML/MIN/1.73 M^2
EST. GFR  (NON AFRICAN AMERICAN): >60 ML/MIN/1.73 M^2
GLUCOSE SERPL-MCNC: 95 MG/DL (ref 70–110)
HCT VFR BLD AUTO: 40.5 % (ref 37–48.5)
HGB BLD-MCNC: 13.5 G/DL (ref 12–16)
IMM GRANULOCYTES # BLD AUTO: 0.02 K/UL (ref 0–0.04)
IMM GRANULOCYTES NFR BLD AUTO: 0.3 % (ref 0–0.5)
LYMPHOCYTES # BLD AUTO: 1.2 K/UL (ref 1–4.8)
LYMPHOCYTES NFR BLD: 16.3 % (ref 18–48)
MCH RBC QN AUTO: 30.8 PG (ref 27–31)
MCHC RBC AUTO-ENTMCNC: 33.3 G/DL (ref 32–36)
MCV RBC AUTO: 93 FL (ref 82–98)
MONOCYTES # BLD AUTO: 0.4 K/UL (ref 0.3–1)
MONOCYTES NFR BLD: 6.1 % (ref 4–15)
NEUTROPHILS # BLD AUTO: 5.4 K/UL (ref 1.8–7.7)
NEUTROPHILS NFR BLD: 75.2 % (ref 38–73)
NRBC BLD-RTO: 0 /100 WBC
PLATELET # BLD AUTO: 232 K/UL (ref 150–450)
PMV BLD AUTO: 9.8 FL (ref 9.2–12.9)
POTASSIUM SERPL-SCNC: 4.8 MMOL/L (ref 3.5–5.1)
PROT SERPL-MCNC: 7.6 G/DL (ref 6–8.4)
RBC # BLD AUTO: 4.38 M/UL (ref 4–5.4)
SODIUM SERPL-SCNC: 138 MMOL/L (ref 136–145)
TROPONIN I SERPL DL<=0.01 NG/ML-MCNC: <0.03 NG/ML
TROPONIN I SERPL DL<=0.01 NG/ML-MCNC: <0.03 NG/ML
TSH SERPL DL<=0.005 MIU/L-ACNC: 2.04 UIU/ML (ref 0.34–5.6)
WBC # BLD AUTO: 7.16 K/UL (ref 3.9–12.7)

## 2022-04-04 PROCEDURE — 84443 ASSAY THYROID STIM HORMONE: CPT | Performed by: NURSE PRACTITIONER

## 2022-04-04 PROCEDURE — 85379 FIBRIN DEGRADATION QUANT: CPT | Performed by: NURSE PRACTITIONER

## 2022-04-04 PROCEDURE — 99284 EMERGENCY DEPT VISIT MOD MDM: CPT | Mod: 25

## 2022-04-04 PROCEDURE — 85025 COMPLETE CBC W/AUTO DIFF WBC: CPT | Performed by: NURSE PRACTITIONER

## 2022-04-04 PROCEDURE — 93010 EKG 12-LEAD: ICD-10-PCS | Mod: ,,, | Performed by: GENERAL PRACTICE

## 2022-04-04 PROCEDURE — 83880 ASSAY OF NATRIURETIC PEPTIDE: CPT | Performed by: NURSE PRACTITIONER

## 2022-04-04 PROCEDURE — 84484 ASSAY OF TROPONIN QUANT: CPT | Performed by: NURSE PRACTITIONER

## 2022-04-04 PROCEDURE — 80053 COMPREHEN METABOLIC PANEL: CPT | Performed by: NURSE PRACTITIONER

## 2022-04-04 PROCEDURE — 36415 COLL VENOUS BLD VENIPUNCTURE: CPT | Performed by: NURSE PRACTITIONER

## 2022-04-04 PROCEDURE — 93005 ELECTROCARDIOGRAM TRACING: CPT | Performed by: GENERAL PRACTICE

## 2022-04-04 PROCEDURE — 81025 URINE PREGNANCY TEST: CPT | Performed by: NURSE PRACTITIONER

## 2022-04-04 PROCEDURE — 93010 ELECTROCARDIOGRAM REPORT: CPT | Mod: ,,, | Performed by: GENERAL PRACTICE

## 2022-04-04 RX ORDER — FLUTICASONE FUROATE AND VILANTEROL TRIFENATATE 100; 25 UG/1; UG/1
1 POWDER RESPIRATORY (INHALATION) DAILY
COMMUNITY
End: 2022-09-01

## 2022-04-04 RX ORDER — MIDODRINE HYDROCHLORIDE 10 MG/1
10 TABLET ORAL 2 TIMES DAILY
COMMUNITY
End: 2022-11-01 | Stop reason: SDUPTHER

## 2022-04-04 RX ORDER — SILDENAFIL CITRATE 20 MG/1
20 TABLET ORAL 3 TIMES DAILY
COMMUNITY

## 2022-04-04 RX ORDER — SERTRALINE HYDROCHLORIDE 50 MG/1
50 TABLET, FILM COATED ORAL DAILY
COMMUNITY
End: 2022-09-01

## 2022-04-04 RX ORDER — MONTELUKAST SODIUM 10 MG/1
10 TABLET ORAL NIGHTLY
COMMUNITY
End: 2022-09-01

## 2022-04-04 NOTE — ED PROVIDER NOTES
Encounter Date: 4/4/2022       History     Chief Complaint   Patient presents with    Palpitations     THIS AM, 150 PER PT    Nausea     Dakota Scanlon is a 41 year old female with pmh asthma, paroxysmal atrial fibrillation, nervous disorder presenting to the ED with c/o palpitations, chest pain, and SOB. 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 Her watch indicated a heart rate of 150 and she states these symptoms lasted approximately 30 minutes and resolved prior to arrival in ED. She began taking zoloft 3 days ago. She denies any symptoms at this time. She is under the care of cardiology with Dr. Castro.         Review of patient's allergies indicates:   Allergen Reactions    Amoxicillin     Sulfa (sulfonamide antibiotics)      Past Medical History:   Diagnosis Date    Arthritis     Asthma     Nervous disorder     Paroxysmal atrial fibrillation      No past surgical history on file.  Family History   Problem Relation Age of Onset    Hypertension Father     Diabetes Father     Heart disease Sister      Social History     Tobacco Use    Smoking status: Current Every Day Smoker     Packs/day: 1.00     Types: Cigarettes    Smokeless tobacco: Never Used    Tobacco comment: on the patch at this time but still smoking 4-5 cigs a day   Substance Use Topics    Alcohol use: Yes     Comment: occasionaly     Drug use: Never     Review of Systems   Constitutional: Negative for fever.   HENT: Negative for congestion and sore throat.    Respiratory: Positive for shortness of breath.    Cardiovascular: Positive for chest pain and palpitations.   Gastrointestinal: Negative for diarrhea, nausea and vomiting.   Genitourinary: Negative for dysuria.   Musculoskeletal: Negative for back pain.   Skin: Negative for rash.   Neurological: Negative for dizziness, weakness and headaches.   Hematological: Does not bruise/bleed easily.        Physical Exam     Initial Vitals [04/04/22 0956]   BP Pulse Resp Temp SpO2   (!) 124/59 83 20 98.5 °F (36.9 °C) 97 %      MAP       --         Physical Exam    Nursing note and vitals reviewed.  Constitutional: She appears well-developed and well-nourished. She is not diaphoretic. No distress.   HENT:   Head: Normocephalic and atraumatic.   Mouth/Throat: Oropharynx is clear and moist.   Eyes: Conjunctivae are normal.   Neck: Neck supple.   Cardiovascular: Normal rate, regular rhythm, normal heart sounds and intact distal pulses. Exam reveals no gallop and no friction rub.    No murmur heard.  Pulmonary/Chest: Breath sounds normal. No respiratory distress. She has no wheezes. She has no rhonchi. She has no rales.   Abdominal: Abdomen is soft. She exhibits no distension. There is no abdominal tenderness.   Musculoskeletal:         General: Normal range of motion.      Cervical back: Neck supple.     Neurological: She is alert and oriented to person, place, and time.   Skin: No rash noted. No erythema.   Psychiatric: Her speech is normal.         ED Course   Procedures  Labs Reviewed   CBC W/ AUTO DIFFERENTIAL - Abnormal; Notable for the following components:       Result Value    Gran % 75.2 (*)     Lymph % 16.3 (*)     All other components within normal limits   COMPREHENSIVE METABOLIC PANEL - Abnormal; Notable for the following components:    CO2 20 (*)     All other components within normal limits   TROPONIN I   B-TYPE NATRIURETIC PEPTIDE   TSH   B-TYPE NATRIURETIC PEPTIDE   TSH   TROPONIN I   D DIMER, QUANTITATIVE   POCT URINE PREGNANCY        ECG Results          EKG 12-lead (In process)  Result time 04/04/22 10:09:42    In process by Interface, Lab In [facility] (04/04/22 10:09:42)                 Narrative:    Test Reason : R00.2,    Vent. Rate : 073 BPM     Atrial Rate : 073 BPM     P-R Int : 140 ms          QRS Dur : 072 ms      QT Int : 408 ms       P-R-T Axes : 054 046 018 degrees     QTc Int : 449  ms    Normal sinus rhythm  Normal ECG  When compared with ECG of 22-DEC-2020 16:08,  No significant change was found    Referred By: [email or web address]   SELF           Confirmed By:                             Imaging Results          X-Ray Chest AP Portable (Final result)  Result time 04/04/22 10:58:04    Final result by Ave Galan MD (04/04/22 10:58:04)                 Narrative:    XR CHEST 1 VIEW    CLINICAL HISTORY:  41 years Female palpitations, nausea    COMPARISON: 12/22/2020    FINDINGS: Cardiomediastinal silhouette is within normal limits. Lungs are normally expanded with no airspace consolidation. No pleural effusion or pneumothorax. No acute osseous abnormality.    IMPRESSION: No acute pulmonary process.    Electronically signed by:  Ave Galan MD  4/4/2022 10:58 AM CDT Workstation: [id number]                               Medications - No data to display     Additional MDM:   Heart Score:    History:          Moderately suspicious.  ECG:             Normal  Age:               Less than 45 years  Risk factors: 1-2 risk factors  Troponin:       Less than or equal to normal limit  Final Score: 2        APC / Resident Notes:   The patient has a heart score of 2 with serial troponins both negative. She had no tachycardia, chest pain, or SOB while in the ED. D-dimer is negative and I do not suspect PE. She is under the care of Dr. Castro with cardiology and is able to follow up with him for further workup. No evidence of pneumothorax or pneumonia on CXR. No need for admission at this time for emergent cardiology evaluation. I discussed the case with Dr. Neff who agrees with plan of care and discharge of patient. Strict ED return precautions for any worsening symtpoms discussed and patient verbalized understanding. Based on my clinical evaluation, I do not appreciate any immediate, emergent, or life threatening condition or etiology that warrants additional workup today and feel that the patient can be  discharged with close follow up care.                    Clinical Impression:   Final diagnoses:  [R00.2] Palpitations (Primary)  [R07.9] Chest pain          ED Disposition Condition    Discharge Stable        ED Prescriptions     None        Follow-up Information     Follow up With Specialties Details Why Contact Info Additional Information    Pending sale to [facility] - Emergency Dept Emergency Medicine  As needed, If symptoms worsen [address] [facility] [id number]  [phone] 1st floor    Austin Castro MD Cardiology, Interventional Cardiology Schedule an appointment as soon as possible for a visit   [address]  [address]  [facility] [address]  [phone]              Lisbeth Church NP  04/04/22 3650

## 2022-04-04 NOTE — Clinical Note
"Dakota Oscarivette Scanlon was seen and treated in our emergency department on 4/4/2022.  She may return to work on 04/06/2022.       If you have any questions or concerns, please don't hesitate to call.       RN    "

## 2022-06-28 ENCOUNTER — HOSPITAL ENCOUNTER (EMERGENCY)
Facility: HOSPITAL | Age: 42
Discharge: HOME | End: 2022-06-28
Attending: EMERGENCY MEDICINE
Payer: COMMERCIAL

## 2022-06-28 VITALS
WEIGHT: 190 LBS | OXYGEN SATURATION: 99 % | TEMPERATURE: 98 F | HEIGHT: 61 IN | BODY MASS INDEX: 35.87 KG/M2 | RESPIRATION RATE: 18 BRPM | DIASTOLIC BLOOD PRESSURE: 77 MMHG | SYSTOLIC BLOOD PRESSURE: 133 MMHG | HEART RATE: 53 BPM

## 2022-06-28 DIAGNOSIS — R10.30 LOWER ABDOMINAL PAIN: Primary | ICD-10-CM

## 2022-06-28 LAB
ALBUMIN SERPL BCP-MCNC: 4 G/DL (ref 3.5–5.2)
ALP SERPL-CCNC: 74 U/L (ref 55–135)
ALT SERPL W/O P-5'-P-CCNC: 16 U/L (ref 10–44)
ANION GAP SERPL CALC-SCNC: 11 MMOL/L (ref 8–16)
AST SERPL-CCNC: 16 U/L (ref 10–40)
B-HCG UR QL: NEGATIVE
BACTERIA #/AREA URNS HPF: ABNORMAL /HPF
BASOPHILS # BLD AUTO: 0.03 K/UL (ref 0–0.2)
BASOPHILS NFR BLD: 0.7 % (ref 0–1.9)
BILIRUB SERPL-MCNC: 0.3 MG/DL (ref 0.1–1)
BILIRUB UR QL STRIP: NEGATIVE
BUN SERPL-MCNC: 13 MG/DL (ref 6–20)
CALCIUM SERPL-MCNC: 9.5 MG/DL (ref 8.7–10.5)
CHLORIDE SERPL-SCNC: 110 MMOL/L (ref 95–110)
CLARITY UR: CLEAR
CO2 SERPL-SCNC: 18 MMOL/L (ref 23–29)
COLOR UR: YELLOW
CREAT SERPL-MCNC: 0.8 MG/DL (ref 0.5–1.4)
DIFFERENTIAL METHOD: ABNORMAL
EOSINOPHIL # BLD AUTO: 0.1 K/UL (ref 0–0.5)
EOSINOPHIL NFR BLD: 2.4 % (ref 0–8)
ERYTHROCYTE [DISTWIDTH] IN BLOOD BY AUTOMATED COUNT: 13.3 % (ref 11.5–14.5)
EST. GFR  (AFRICAN AMERICAN): >60 ML/MIN/1.73 M^2
EST. GFR  (NON AFRICAN AMERICAN): >60 ML/MIN/1.73 M^2
GLUCOSE SERPL-MCNC: 103 MG/DL (ref 70–110)
GLUCOSE UR QL STRIP: NEGATIVE
HCT VFR BLD AUTO: 42.9 % (ref 37–48.5)
HGB BLD-MCNC: 14.1 G/DL (ref 12–16)
HGB UR QL STRIP: ABNORMAL
IMM GRANULOCYTES # BLD AUTO: 0.02 K/UL (ref 0–0.04)
IMM GRANULOCYTES NFR BLD AUTO: 0.4 % (ref 0–0.5)
KETONES UR QL STRIP: NEGATIVE
LEUKOCYTE ESTERASE UR QL STRIP: NEGATIVE
LIPASE SERPL-CCNC: 19 U/L (ref 4–60)
LYMPHOCYTES # BLD AUTO: 1.1 K/UL (ref 1–4.8)
LYMPHOCYTES NFR BLD: 24.7 % (ref 18–48)
MCH RBC QN AUTO: 31.5 PG (ref 27–31)
MCHC RBC AUTO-ENTMCNC: 32.9 G/DL (ref 32–36)
MCV RBC AUTO: 96 FL (ref 82–98)
MICROSCOPIC COMMENT: ABNORMAL
MONOCYTES # BLD AUTO: 0.4 K/UL (ref 0.3–1)
MONOCYTES NFR BLD: 8.7 % (ref 4–15)
NEUTROPHILS # BLD AUTO: 2.9 K/UL (ref 1.8–7.7)
NEUTROPHILS NFR BLD: 63.1 % (ref 38–73)
NITRITE UR QL STRIP: NEGATIVE
NRBC BLD-RTO: 0 /100 WBC
PH UR STRIP: 7 [PH] (ref 5–8)
PLATELET # BLD AUTO: 254 K/UL (ref 150–450)
PMV BLD AUTO: 9.9 FL (ref 9.2–12.9)
POTASSIUM SERPL-SCNC: 3.9 MMOL/L (ref 3.5–5.1)
PROT SERPL-MCNC: 7 G/DL (ref 6–8.4)
PROT UR QL STRIP: NEGATIVE
RBC # BLD AUTO: 4.48 M/UL (ref 4–5.4)
RBC #/AREA URNS HPF: 3 /HPF (ref 0–4)
SODIUM SERPL-SCNC: 139 MMOL/L (ref 136–145)
SP GR UR STRIP: <=1.005 (ref 1–1.03)
SQUAMOUS #/AREA URNS HPF: 6 /HPF
URN SPEC COLLECT METH UR: ABNORMAL
UROBILINOGEN UR STRIP-ACNC: NEGATIVE EU/DL
WBC # BLD AUTO: 4.61 K/UL (ref 3.9–12.7)
WBC #/AREA URNS HPF: 6 /HPF (ref 0–5)

## 2022-06-28 PROCEDURE — 87389 HIV-1 AG W/HIV-1&-2 AB AG IA: CPT | Performed by: EMERGENCY MEDICINE

## 2022-06-28 PROCEDURE — 25500020 PHARM REV CODE 255

## 2022-06-28 PROCEDURE — 96374 THER/PROPH/DIAG INJ IV PUSH: CPT

## 2022-06-28 PROCEDURE — 80053 COMPREHEN METABOLIC PANEL: CPT | Performed by: PHYSICIAN ASSISTANT

## 2022-06-28 PROCEDURE — 81025 URINE PREGNANCY TEST: CPT | Performed by: PHYSICIAN ASSISTANT

## 2022-06-28 PROCEDURE — 36415 COLL VENOUS BLD VENIPUNCTURE: CPT | Performed by: PHYSICIAN ASSISTANT

## 2022-06-28 PROCEDURE — 83690 ASSAY OF LIPASE: CPT | Performed by: PHYSICIAN ASSISTANT

## 2022-06-28 PROCEDURE — 81000 URINALYSIS NONAUTO W/SCOPE: CPT | Performed by: PHYSICIAN ASSISTANT

## 2022-06-28 PROCEDURE — 85025 COMPLETE CBC W/AUTO DIFF WBC: CPT | Performed by: PHYSICIAN ASSISTANT

## 2022-06-28 PROCEDURE — 99285 EMERGENCY DEPT VISIT HI MDM: CPT | Mod: 25

## 2022-06-28 PROCEDURE — 86803 HEPATITIS C AB TEST: CPT | Performed by: EMERGENCY MEDICINE

## 2022-06-28 PROCEDURE — 63600175 PHARM REV CODE 636 W HCPCS: Performed by: PHYSICIAN ASSISTANT

## 2022-06-28 RX ORDER — HYDROCODONE BITARTRATE AND ACETAMINOPHEN 5; 325 MG/1; MG/1
1 TABLET ORAL EVERY 6 HOURS PRN
Qty: 12 TABLET | Refills: 0 | Status: SHIPPED | OUTPATIENT
Start: 2022-06-28

## 2022-06-28 RX ORDER — KETOROLAC TROMETHAMINE 30 MG/ML
15 INJECTION, SOLUTION INTRAMUSCULAR; INTRAVENOUS
Status: COMPLETED | OUTPATIENT
Start: 2022-06-28 | End: 2022-06-28

## 2022-06-28 RX ADMIN — KETOROLAC TROMETHAMINE 15 MG: 30 INJECTION, SOLUTION INTRAMUSCULAR at 12:06

## 2022-06-28 RX ADMIN — IOHEXOL 100 ML: 350 INJECTION, SOLUTION INTRAVENOUS at 11:06

## 2022-06-28 NOTE — DISCHARGE INSTRUCTIONS
Take tylenol or motrin as needed.  Follow up with your primary care provider and your GYN.  For worsening symptoms, chest pain, shortness of breath, increased abdominal pain, high grade fever, stroke or stroke like symptoms, immediately go to the nearest Emergency Room or call 911 as soon as possible.

## 2022-06-28 NOTE — ED PROVIDER NOTES
Encounter Date: 6/28/2022       History     Chief Complaint   Patient presents with    Abdominal Pain     Reports left sided abd pain x 10 days     Patient is a 42 year old female who presents with lower abdominal pain for 10 days. She has PMH significant for arthritis, asthma and a fib. She reports left lower abdominal pain. She reports she had associated vaginal bleeding. She saw her GYN and was started on medication to stop the bleeding. She reports the vaginal bleeding has stopped. She denied urinary symptoms. She reports normal bowel movements. Mild nausea associated with pain with no vomiting.         Review of patient's allergies indicates:   Allergen Reactions    Amoxicillin     Sulfa (sulfonamide antibiotics)      Past Medical History:   Diagnosis Date    Arthritis     Asthma     Nervous disorder     Paroxysmal atrial fibrillation      History reviewed. No pertinent surgical history.  Family History   Problem Relation Age of Onset    Hypertension Father     Diabetes Father     Heart disease Sister      Social History     Tobacco Use    Smoking status: Current Every Day Smoker     Packs/day: 1.00     Types: Cigarettes    Smokeless tobacco: Never Used    Tobacco comment: on the patch at this time but still smoking 4-5 cigs a day   Substance Use Topics    Alcohol use: Yes     Comment: occasionaly     Drug use: Never     Review of Systems   Constitutional: Negative for activity change, appetite change, chills and fever.   HENT: Negative for congestion, rhinorrhea and sore throat.    Eyes: Negative for redness and visual disturbance.   Respiratory: Negative for cough, chest tightness and shortness of breath.    Cardiovascular: Negative for chest pain.   Gastrointestinal: Positive for abdominal pain. Negative for diarrhea, nausea and vomiting.   Genitourinary: Positive for vaginal bleeding. Negative for dysuria, frequency and vaginal pain.   Musculoskeletal: Negative for back pain, neck pain and  neck stiffness.   Skin: Negative for rash.   Neurological: Negative for dizziness, syncope, numbness and headaches.       Physical Exam     Initial Vitals [06/28/22 1031]   BP Pulse Resp Temp SpO2   139/60 80 18 98.3 °F (36.8 °C) 99 %      MAP       --         Physical Exam    Nursing note and vitals reviewed.  Constitutional: Vital signs are normal. She appears well-developed and well-nourished. She is cooperative.  Non-toxic appearance. She does not have a sickly appearance.   HENT:   Head: Normocephalic and atraumatic.   Right Ear: External ear normal.   Left Ear: External ear normal.   Nose: Nose normal.   Eyes: Conjunctivae and lids are normal.   Neck:    Full passive range of motion without pain.     Cardiovascular: Normal rate, regular rhythm and normal heart sounds. Exam reveals no gallop and no friction rub.    No murmur heard.  Pulmonary/Chest: Breath sounds normal. She has no wheezes. She has no rhonchi. She has no rales.   Abdominal: Abdomen is soft. There is abdominal tenderness in the right lower quadrant and left lower quadrant.   Generalized lower abdominal tenderness to palpation. No rebound or guarding. There is no rebound and no guarding.   Genitourinary: There is no rash, tenderness or lesion on the right labia. There is no rash, tenderness or lesion on the left labia. Cervix exhibits no motion tenderness and no discharge. Right adnexum displays no mass and no tenderness. Left adnexum displays no mass and no tenderness.    Vaginal bleeding present.      No vaginal discharge or erythema.   There is bleeding in the vagina. No erythema in the vagina.    No foreign body in the vagina.      No signs of injury in the vagina.      Genitourinary Comments: Scant amount of old blood in the vaginal vault. Diffuse tenderness. No CMT.     Musculoskeletal:      Cervical back: Full passive range of motion without pain.     Neurological: She is alert.   Skin: Skin is warm, dry and intact. No rash noted.          ED Course   Procedures  Labs Reviewed   CBC W/ AUTO DIFFERENTIAL - Abnormal; Notable for the following components:       Result Value    MCH 31.5 (*)     All other components within normal limits    Narrative:     Release to patient->Immediate   COMPREHENSIVE METABOLIC PANEL - Abnormal; Notable for the following components:    CO2 18 (*)     All other components within normal limits    Narrative:     Release to patient->Immediate   URINALYSIS, REFLEX TO URINE CULTURE - Abnormal; Notable for the following components:    Specific Gravity, UA <=1.005 (*)     Occult Blood UA 3+ (*)     All other components within normal limits    Narrative:     Specimen Source->Urine   URINALYSIS MICROSCOPIC - Abnormal; Notable for the following components:    WBC, UA 6 (*)     Bacteria Few (*)     All other components within normal limits    Narrative:     Specimen Source->Urine   LIPASE    Narrative:     Release to patient->Immediate   PREGNANCY TEST, URINE RAPID    Narrative:     Specimen Source->Urine   HIV 1 / 2 ANTIBODY   HEPATITIS C ANTIBODY          Imaging Results          US Pelvis Comp with Transvag NON-OB (xpd (Final result)  Result time 06/28/22 14:05:41    Final result by Alo Marcelo Jr., MD (06/28/22 14:05:41)                 Impression:      Normal size uterus.  Neither ovary is identified.  No other masses or fluid collections seen      Electronically signed by: Alo Marcelo MD  Date:    06/28/2022  Time:    14:05             Narrative:    EXAMINATION:  US PELVIS COMP WITH TRANSVAG NON-OB (XPD)    CLINICAL HISTORY:  lower abdominal pian with vaginal bleeding;    TECHNIQUE:  Transabdominal sonography of the pelvis was performed, followed by transvaginal sonography to better evaluate the uterus and ovaries.    COMPARISON:  None.    FINDINGS:  Uterus:    Size: 7.3 x 3.2 cm    Masses: None    Endometrium: Normal in this pre menopausal patient, measuring 6 mm.    Neither the right or left ovary is  identified.  No other masses or fluid collections are seen.    Free Fluid:    None.                               CT Abdomen Pelvis With Contrast (Final result)  Result time 06/28/22 12:07:19    Final result by Alo Marcelo Jr., MD (06/28/22 12:07:19)                 Impression:      Three small cyst of the liver parenchyma.  Otherwise negative CT of the abdomen and pelvis.  An etiology for left lower quadrant pain is not identified.      Electronically signed by: Alo Marcelo MD  Date:    06/28/2022  Time:    12:07             Narrative:    EXAMINATION:  CT ABDOMEN PELVIS WITH CONTRAST    CLINICAL HISTORY:  LLQ abdominal pain;    TECHNIQUE:  Low dose axial images, sagittal and coronal reformations were obtained from the lung bases to the pubic symphysis following the IV administration of 100 mL of Omnipaque 350    COMPARISON:  CT chest of January 5, 2022.    FINDINGS:  The liver is of normal size and general CT density.  There is a 4 mm probable cyst in the dome of the right lobe.  There is a 5 mm small cyst in the left lobe unchanged from the prior study and a 1.1 cm bilobed cyst in the central right lobe unchanged from the prior study as well.  No new masses are seen.  The gallbladder is of normal size without CT evidence of stone.  The pancreas is of normal contour and CT density without edema or mass.  The spleen is of normal size and CT density.    The adrenal glands are not enlarged.  The kidneys are of normal size contour and contrast enhancement without mass, stone or hydronephrosis.  The abdominal aorta and inferior vena cava are of normal caliber.    The stomach is of normal configuration.  Small bowel dilatation or air-fluid levels are not seen.  The colon appears of normal configuration without dilation or mass.  It is redundant with the cecum ending in the pelvis and a normal appendix seen in the central pelvis.  Free fluid or free air is not seen.  The bladder is of normal contour without  mass or asymmetry.  The uterus is not enlarged.  No free fluid is seen.                                 Medications   ketorolac injection 15 mg (15 mg Intravenous Given 6/28/22 1228)   iohexoL (OMNIPAQUE 350) 350 mg iodine/mL injection (100 mLs Intravenous Given 6/28/22 1149)     Medical Decision Making:   History:   Old Medical Records: I decided to obtain old medical records.  Clinical Tests:   Lab Tests: Ordered and Reviewed  Radiological Study: Ordered and Reviewed       APC / Resident Notes:   Urgent evaluation of a 42-year-old female who presents with lower abdominal pain.  She reports associated vaginal bleeding for which she saw her gynecologist and was prescribed medications.  She states she was supposed to have an outpatient ultrasound today but it got postponed for a month.  She denies vaginal discharge.  She denies urinary symptoms.  She denies fever.  She has generalized lower abdominal tenderness to palpation.  Labs showed no acute abnormalities.  Urinalysis shows no sign of urinary tract infection.  CT scan is unremarkable.  Pelvic exam shows some diffuse tenderness and old blood in the vaginal vault.  No CMT.  Doubt PID.  Ultrasound shows no acute abnormalities.  Recommend close follow-up with primary care and gyn. Discussed results with patient. Return precautions given. Based on my clinical evaluation, I do not appreciate any immediate, emergent, or life threatening condition or etiology that warrants additional workup today and feel that the patient can be discharged with close follow up care.  Patient is to follow up with their primary care provider. Case was discussed with Dr. Gilmore who is in agreement with the plan of care. All questions answered.                    Clinical Impression:   Final diagnoses:  [R10.30] Lower abdominal pain (Primary)          ED Disposition Condition    Discharge Stable        ED Prescriptions     Medication Sig Dispense Start Date End Date Auth. Provider     HYDROcodone-acetaminophen (NORCO) 5-325 mg per tablet Take 1 tablet by mouth every 6 (six) hours as needed for Pain. 12 tablet 6/28/2022  Idania Nielsen PA-C        Follow-up Information     Follow up With Specialties Details Why Contact Info    Sera Myles MD Family Medicine   [address] [address]  [phone]      [facility] Dept Emergency Medicine  As needed [address] [place] [id number] [phone]           Idania Nielsen PA-C  06/28/22 0219

## 2022-06-28 NOTE — Clinical Note
"Dakota"Kerry Scanlon was seen and treated in our emergency department on 6/28/2022.  She may return to work on 06/30/2022.       If you have any questions or concerns, please don't hesitate to call.      Idania Nielsen PA-C"

## 2022-06-29 LAB
HCV AB SERPL QL IA: NEGATIVE
HIV 1+2 AB+HIV1 P24 AG SERPL QL IA: NEGATIVE

## 2022-08-17 ENCOUNTER — TELEPHONE (OUTPATIENT)
Dept: PULMONOLOGY | Facility: CLINIC | Age: 42
End: 2022-08-17

## 2022-08-17 NOTE — TELEPHONE ENCOUNTER
----- Message from Francisco Maier sent at 8/17/2022 11:53 AM CDT -----  Regarding: requsting a new Rx  Patient stated that she needs a new Rx for her inhaler. Please give her a call [phone]

## 2022-08-17 NOTE — TELEPHONE ENCOUNTER
Called and LVM for pt stating she needs an apt we have not seen her in two years but (looks like she has workers comp insurance).  She also needs to let us know what inhaler she is requesting refills on she did not give that info to Francisco

## 2022-09-01 ENCOUNTER — OFFICE VISIT (OUTPATIENT)
Dept: FAMILY MEDICINE | Facility: CLINIC | Age: 42
End: 2022-09-01
Payer: COMMERCIAL

## 2022-09-01 VITALS
DIASTOLIC BLOOD PRESSURE: 66 MMHG | OXYGEN SATURATION: 98 % | HEIGHT: 61 IN | RESPIRATION RATE: 18 BRPM | HEART RATE: 76 BPM | BODY MASS INDEX: 35.57 KG/M2 | SYSTOLIC BLOOD PRESSURE: 128 MMHG | TEMPERATURE: 98 F | WEIGHT: 188.38 LBS

## 2022-09-01 DIAGNOSIS — Z12.31 ENCOUNTER FOR SCREENING MAMMOGRAM FOR BREAST CANCER: ICD-10-CM

## 2022-09-01 DIAGNOSIS — Z72.0 TOBACCO ABUSE: Primary | ICD-10-CM

## 2022-09-01 DIAGNOSIS — F31.9 BIPOLAR AFFECTIVE DISORDER, REMISSION STATUS UNSPECIFIED: ICD-10-CM

## 2022-09-01 DIAGNOSIS — Z23 IMMUNIZATION DUE: ICD-10-CM

## 2022-09-01 DIAGNOSIS — J45.20 MILD INTERMITTENT ASTHMA WITHOUT COMPLICATION: ICD-10-CM

## 2022-09-01 DIAGNOSIS — Z00.00 PREVENTATIVE HEALTH CARE: ICD-10-CM

## 2022-09-01 PROCEDURE — 90677 PNEUMOCOCCAL CONJUGATE VACCINE 20-VALENT: ICD-10-PCS | Mod: S$GLB,,, | Performed by: FAMILY MEDICINE

## 2022-09-01 PROCEDURE — 90471 IMMUNIZATION ADMIN: CPT | Mod: S$GLB,,, | Performed by: FAMILY MEDICINE

## 2022-09-01 PROCEDURE — 99204 PR OFFICE/OUTPT VISIT, NEW, LEVL IV, 45-59 MIN: ICD-10-PCS | Mod: 25,S$GLB,, | Performed by: FAMILY MEDICINE

## 2022-09-01 PROCEDURE — 90471 PNEUMOCOCCAL CONJUGATE VACCINE 20-VALENT: ICD-10-PCS | Mod: S$GLB,,, | Performed by: FAMILY MEDICINE

## 2022-09-01 PROCEDURE — 99204 OFFICE O/P NEW MOD 45 MIN: CPT | Mod: 25,S$GLB,, | Performed by: FAMILY MEDICINE

## 2022-09-01 PROCEDURE — 90677 PCV20 VACCINE IM: CPT | Mod: S$GLB,,, | Performed by: FAMILY MEDICINE

## 2022-09-01 RX ORDER — DIVALPROEX SODIUM 125 MG/1
125 CAPSULE, COATED PELLETS ORAL 2 TIMES DAILY
Qty: 60 CAPSULE | Refills: 11 | Status: SHIPPED | OUTPATIENT
Start: 2022-09-01 | End: 2022-11-01

## 2022-09-01 RX ORDER — MEDROXYPROGESTERONE ACETATE 10 MG/1
10 TABLET ORAL DAILY
COMMUNITY
Start: 2022-06-24

## 2022-09-01 RX ORDER — ALBUTEROL SULFATE 90 UG/1
2 AEROSOL, METERED RESPIRATORY (INHALATION) EVERY 6 HOURS PRN
Qty: 18 G | Refills: 0 | Status: SHIPPED | OUTPATIENT
Start: 2022-09-01 | End: 2023-02-20

## 2022-09-01 RX ORDER — MONTELUKAST SODIUM 10 MG/1
10 TABLET ORAL NIGHTLY
Qty: 90 TABLET | Refills: 3 | Status: SHIPPED | OUTPATIENT
Start: 2022-09-01 | End: 2023-09-01

## 2022-09-01 RX ORDER — CITALOPRAM 40 MG/1
40 TABLET, FILM COATED ORAL DAILY
Qty: 30 TABLET | Refills: 11 | Status: SHIPPED | OUTPATIENT
Start: 2022-09-01 | End: 2022-11-01 | Stop reason: SDUPTHER

## 2022-09-01 RX ORDER — CLOTRIMAZOLE AND BETAMETHASONE DIPROPIONATE 10; .64 MG/G; MG/G
CREAM TOPICAL
COMMUNITY
Start: 2022-06-24

## 2022-09-01 RX ORDER — FLUTICASONE FUROATE AND VILANTEROL 100; 25 UG/1; UG/1
1 POWDER RESPIRATORY (INHALATION) DAILY
Qty: 30 EACH | Refills: 11 | Status: SHIPPED | OUTPATIENT
Start: 2022-09-01 | End: 2023-09-01

## 2022-09-01 NOTE — PATIENT INSTRUCTIONS
Peak flow meter to monitor with asthma.  Log of inhaler use bring with you on return to clinic in 1 month
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
## 2022-10-04 ENCOUNTER — OFFICE VISIT (OUTPATIENT)
Dept: PULMONOLOGY | Facility: CLINIC | Age: 42
End: 2022-10-04
Payer: COMMERCIAL

## 2022-10-04 VITALS
HEIGHT: 61 IN | BODY MASS INDEX: 34.74 KG/M2 | OXYGEN SATURATION: 98 % | SYSTOLIC BLOOD PRESSURE: 105 MMHG | DIASTOLIC BLOOD PRESSURE: 70 MMHG | WEIGHT: 184 LBS | HEART RATE: 72 BPM

## 2022-10-04 DIAGNOSIS — G47.33 OSA (OBSTRUCTIVE SLEEP APNEA): Primary | ICD-10-CM

## 2022-10-04 PROCEDURE — 99214 PR OFFICE/OUTPT VISIT, EST, LEVL IV, 30-39 MIN: ICD-10-PCS | Mod: S$GLB,,, | Performed by: INTERNAL MEDICINE

## 2022-10-04 PROCEDURE — 99214 OFFICE O/P EST MOD 30 MIN: CPT | Mod: S$GLB,,, | Performed by: INTERNAL MEDICINE

## 2022-10-04 NOTE — PROGRESS NOTES
"    SUBJECTIVE:    Patient ID: Dakota Scanlon is a 42 y.o. female.    Chief Complaint: Follow-up    HPI the patient is here after 2 years.  She needs her CPAP pressure increased.  She regained 50 pounds.  She attributes this to stress.  She is now severely hypersomnolent again; she is snoring despite her CPAP.  Past Medical History:   Diagnosis Date    Arthritis     Asthma     Bipolar disorder     Endometriosis, unspecified     Nervous disorder     Paroxysmal atrial fibrillation      Past Surgical History:   Procedure Laterality Date    CYST REMOVAL Right     ovarie    LIGATION OF VEIN Right     leg     Family History   Problem Relation Age of Onset    Hypertension Father     Diabetes Father     Heart disease Sister         Social History:   Marital Status: Single  Occupation: works in the [employer]  Alcohol History:  reports current alcohol use.  Tobacco History:  reports that she has been smoking cigarettes. She has been smoking an average of .5 packs per day. She has never used smokeless tobacco.  Drug History:  reports no history of drug use.    Review of patient's allergies indicates:   Allergen Reactions    Amoxicillin     Lorazepam Other (See Comments)     "they couldn't get me to wake up"    Sulfa (sulfonamide antibiotics)        Current Outpatient Medications   Medication Sig Dispense Refill    albuterol-ipratropium (DUO-NEB) 2.5 mg-0.5 mg/3 mL nebulizer solution Take 3 mLs by nebulization every 6 (six) hours as needed for Wheezing. 25 vial 2    busPIRone (BUSPAR) 10 MG tablet Take 10 mg by mouth 3 (three) times daily.       citalopram (CELEXA) 40 MG tablet Take 1 tablet (40 mg total) by mouth once daily. 30 tablet 11    clotrimazole-betamethasone 1-0.05% (LOTRISONE) cream Apply topically.      fluticasone furoate-vilanteroL (BREO ELLIPTA) 100-25 mcg/dose diskus inhaler Inhale 1 puff into the lungs once daily. Controller 30 each 11    HYDROcodone-acetaminophen (NORCO) 5-325 mg per tablet Take 1 tablet by " "mouth every 6 (six) hours as needed for Pain. 12 tablet 0    loratadine (CLARITIN) 10 mg tablet Take 10 mg by mouth once daily.      metoprolol succinate (TOPROL-XL) 50 MG 24 hr tablet Take 50 mg by mouth once daily.       midodrine (PROAMATINE) 10 MG tablet Take 10 mg by mouth once daily.       midodrine (PROAMATINE) 10 MG tablet Take 10 mg by mouth 2 (two) times a day.      montelukast (SINGULAIR) 10 mg tablet Take 1 tablet (10 mg total) by mouth every evening. 90 tablet 3    omeprazole (PRILOSEC) 40 MG capsule Take 40 mg by mouth every morning.       sildenafil (REVATIO) 20 mg Tab Take 20 mg by mouth 3 (three) times daily.      tiZANidine (ZANAFLEX) 4 MG tablet Take 4 mg by mouth every 8 (eight) hours.       albuterol (VENTOLIN HFA) 90 mcg/actuation inhaler Inhale 2 puffs into the lungs every 6 (six) hours as needed for Wheezing. Rescue (Patient not taking: Reported on 10/4/2022) 18 g 0    divalproex (DEPAKOTE SPRINKLE) 125 mg capsule Take 1 capsule (125 mg total) by mouth 2 (two) times daily. (Patient not taking: Reported on 10/4/2022) 60 capsule 11    medroxyPROGESTERone (PROVERA) 10 MG tablet Take 10 mg by mouth once daily.      midodrine (PROAMATINE) 5 MG Tab        No current facility-administered medications for this visit.       Alpha-1 Antitrypsin:  Last PFT:   Last CT:    Review of Systems  General: Feeling very sleepy.  Eyes: Vision is good.  ENT:  No sinusitis or pharyngitis.   Heart:: No chest pain or palpitations.  Lungs: No cough, sputum, or wheezing.  GI: No Nausea, vomiting, constipation, diarrhea, or reflux.  : Nocturia 3-4  Musculoskeletal: legs hurt  Skin: gets a rash on her back  Neuro: No headaches or neuropathy.  Lymph: No edema or adenopathy.  Psych: depression is a little better but still there  Endo: weight gain of 50 pounds    OBJECTIVE:      /70 (BP Location: Left arm, Patient Position: Sitting, BP Method: Medium (Manual))   Pulse 72   Ht 5' 1" (1.549 m)   Wt 83.5 kg (184 lb) "   SpO2 98%   BMI 34.77 kg/m²     Physical Exam  GENERAL: Midaged patient in no distress.  HEENT: Pupils equal and reactive. Extraocular movements intact. Nose intact.      Pharynx moist.  Maxillary incisors are rotten.  Mallampati 4  NECK: Supple.  16 3/4  HEART: Regular rate and rhythm. No murmur or gallop auscultated.  LUNGS: Clear to auscultation and percussion. Lung excursion symmetrical. No change in fremitus. No adventitial noises.  ABDOMEN: Bowel sounds present. Non-tender, no masses palpated.  EXTREMITIES: Normal muscle tone and joint movement, no cyanosis or clubbing.   LYMPHATICS: No adenopathy palpated, no edema.  SKIN: Dry, intact, no lesions. Erythematous especially at the chest  NEURO: Cranial nerves II-XII intact. Motor strength 5/5 bilaterally, upper and lower extremities.  PSYCH: Appropriate affect.  [place] Sleepiness Scale 14  Assessment:       1. ENA (obstructive sleep apnea)        The patient has gained 50 lb since her last study in is now hypersomnolent again.  She will need a new sleep study to determine how much pressure she needs now.  She is currently at 9 cm and this is obviously insufficient.  Plan:       ENA (obstructive sleep apnea)       Follow up in about 3 months (around 1/4/2023).  Continue using current CPAP setting until we can get it reset after learning how much pressure she really needs.

## 2022-10-05 ENCOUNTER — HOSPITAL ENCOUNTER (OUTPATIENT)
Dept: RADIOLOGY | Facility: HOSPITAL | Age: 42
Discharge: HOME | End: 2022-10-05
Attending: FAMILY MEDICINE
Payer: COMMERCIAL

## 2022-10-05 ENCOUNTER — OFFICE VISIT (OUTPATIENT)
Dept: FAMILY MEDICINE | Facility: CLINIC | Age: 42
End: 2022-10-05
Payer: COMMERCIAL

## 2022-10-05 VITALS — BODY MASS INDEX: 34.75 KG/M2 | WEIGHT: 184.06 LBS | HEIGHT: 61 IN

## 2022-10-05 VITALS
HEIGHT: 61 IN | HEART RATE: 83 BPM | WEIGHT: 185.13 LBS | TEMPERATURE: 98 F | SYSTOLIC BLOOD PRESSURE: 100 MMHG | OXYGEN SATURATION: 98 % | RESPIRATION RATE: 18 BRPM | DIASTOLIC BLOOD PRESSURE: 72 MMHG | BODY MASS INDEX: 34.95 KG/M2

## 2022-10-05 DIAGNOSIS — Z12.31 ENCOUNTER FOR SCREENING MAMMOGRAM FOR BREAST CANCER: ICD-10-CM

## 2022-10-05 DIAGNOSIS — F31.9 BIPOLAR AFFECTIVE DISORDER, REMISSION STATUS UNSPECIFIED: Primary | ICD-10-CM

## 2022-10-05 PROCEDURE — 90471 FLU VACCINE (QUAD) GREATER THAN OR EQUAL TO 3YO PRESERVATIVE FREE IM: ICD-10-PCS | Mod: S$GLB,,, | Performed by: FAMILY MEDICINE

## 2022-10-05 PROCEDURE — 99213 PR OFFICE/OUTPT VISIT, EST, LEVL III, 20-29 MIN: ICD-10-PCS | Mod: 25,S$GLB,, | Performed by: FAMILY MEDICINE

## 2022-10-05 PROCEDURE — 77063 BREAST TOMOSYNTHESIS BI: CPT | Mod: TC,PO

## 2022-10-05 PROCEDURE — 90686 FLU VACCINE (QUAD) GREATER THAN OR EQUAL TO 3YO PRESERVATIVE FREE IM: ICD-10-PCS | Mod: S$GLB,,, | Performed by: FAMILY MEDICINE

## 2022-10-05 PROCEDURE — 90686 IIV4 VACC NO PRSV 0.5 ML IM: CPT | Mod: S$GLB,,, | Performed by: FAMILY MEDICINE

## 2022-10-05 PROCEDURE — 77067 SCR MAMMO BI INCL CAD: CPT | Mod: TC,PO

## 2022-10-05 PROCEDURE — 90471 IMMUNIZATION ADMIN: CPT | Mod: S$GLB,,, | Performed by: FAMILY MEDICINE

## 2022-10-05 PROCEDURE — 99213 OFFICE O/P EST LOW 20 MIN: CPT | Mod: 25,S$GLB,, | Performed by: FAMILY MEDICINE

## 2022-10-05 RX ORDER — QUETIAPINE FUMARATE 50 MG/1
50 TABLET, FILM COATED ORAL NIGHTLY
Qty: 30 TABLET | Refills: 11 | Status: SHIPPED | OUTPATIENT
Start: 2022-10-05 | End: 2022-11-01

## 2022-10-05 RX ORDER — BUSPIRONE HYDROCHLORIDE 10 MG/1
10 TABLET ORAL 3 TIMES DAILY
Qty: 90 TABLET | Refills: 5 | Status: SHIPPED | OUTPATIENT
Start: 2022-10-05 | End: 2023-07-31

## 2022-10-05 NOTE — PROGRESS NOTES
"Subjective:       Patient ID: Dakota Scanlon is a 42 y.o. female.    Chief Complaint: New Med Request (New medication- Depakote, having side effects)       isela is here for follow-up on Depakote she was started on this because of bipolar symptoms in the absence of a psychiatrist she had multiple side effects, nausea vomiting diarrhea tremors, and dizziness. Stopped it Saturday.  She had only taken 2 days of Depakote prior to stopping.      Allergies and Medications:   Review of patient's allergies indicates:   Allergen Reactions    Amoxicillin     Depakote [divalproex] Diarrhea    Lorazepam Other (See Comments)     "they couldn't get me to wake up"    Sulfa (sulfonamide antibiotics)      Current Outpatient Medications   Medication Sig Dispense Refill    albuterol (VENTOLIN HFA) 90 mcg/actuation inhaler Inhale 2 puffs into the lungs every 6 (six) hours as needed for Wheezing. Rescue 18 g 0    citalopram (CELEXA) 40 MG tablet Take 1 tablet (40 mg total) by mouth once daily. 30 tablet 11    clotrimazole-betamethasone 1-0.05% (LOTRISONE) cream Apply topically.      divalproex (DEPAKOTE SPRINKLE) 125 mg capsule Take 1 capsule (125 mg total) by mouth 2 (two) times daily. 60 capsule 11    fluticasone furoate-vilanteroL (BREO ELLIPTA) 100-25 mcg/dose diskus inhaler Inhale 1 puff into the lungs once daily. Controller 30 each 11    HYDROcodone-acetaminophen (NORCO) 5-325 mg per tablet Take 1 tablet by mouth every 6 (six) hours as needed for Pain. 12 tablet 0    loratadine (CLARITIN) 10 mg tablet Take 10 mg by mouth once daily.      medroxyPROGESTERone (PROVERA) 10 MG tablet Take 10 mg by mouth once daily.      metoprolol succinate (TOPROL-XL) 50 MG 24 hr tablet Take 50 mg by mouth once daily.       midodrine (PROAMATINE) 10 MG tablet Take 10 mg by mouth once daily.       midodrine (PROAMATINE) 10 MG tablet Take 10 mg by mouth 2 (two) times a day.      midodrine (PROAMATINE) 5 MG Tab       montelukast (SINGULAIR) 10 mg " tablet Take 1 tablet (10 mg total) by mouth every evening. 90 tablet 3    omeprazole (PRILOSEC) 40 MG capsule Take 40 mg by mouth every morning.       sildenafil (REVATIO) 20 mg Tab Take 20 mg by mouth 3 (three) times daily.      tiZANidine (ZANAFLEX) 4 MG tablet Take 4 mg by mouth every 8 (eight) hours.       albuterol-ipratropium (DUO-NEB) 2.5 mg-0.5 mg/3 mL nebulizer solution Take 3 mLs by nebulization every 6 (six) hours as needed for Wheezing. 25 vial 2    busPIRone (BUSPAR) 10 MG tablet Take 1 tablet (10 mg total) by mouth 3 (three) times daily. 90 tablet 5    QUEtiapine (SEROQUEL) 50 MG tablet Take 1 tablet (50 mg total) by mouth every evening. 30 tablet 11     No current facility-administered medications for this visit.       Family History:   Family History   Problem Relation Age of Onset    Hypertension Father     Diabetes Father     Heart disease Sister     Breast cancer Cousin        Social History:   Social History     Socioeconomic History    Marital status: Single   Tobacco Use    Smoking status: Every Day     Packs/day: 0.50     Types: Cigarettes    Smokeless tobacco: Never    Tobacco comments:     on the patch at this time but still smoking 4-5 cigs a day   Substance and Sexual Activity    Alcohol use: Yes     Comment: occasionaly     Drug use: Never       Review of Systems    Objective:     Vitals:    10/05/22 1455   BP: 100/72   Pulse: 83   Resp: 18   Temp: 98.2 °F (36.8 °C)        Physical Exam  Vitals and nursing note reviewed.   Constitutional:       General: She is not in acute distress.     Appearance: Normal appearance. She is well-developed and normal weight. She is not ill-appearing, toxic-appearing or diaphoretic.   HENT:      Head: Normocephalic and atraumatic.   Eyes:      Pupils: Pupils are equal, round, and reactive to light.   Cardiovascular:      Rate and Rhythm: Normal rate and regular rhythm.      Heart sounds: Normal heart sounds. No murmur heard.    No friction rub. No gallop.    Pulmonary:      Effort: Pulmonary effort is normal. No respiratory distress.      Breath sounds: Normal breath sounds. No stridor. No wheezing, rhonchi or rales.   Chest:      Chest wall: No tenderness.   Musculoskeletal:      Right lower leg: No edema.      Left lower leg: No edema.   Neurological:      Mental Status: She is alert.   Psychiatric:         Mood and Affect: Mood normal.         Behavior: Behavior normal.         Thought Content: Thought content normal.         Judgment: Judgment normal.       Assessment:       1. Bipolar affective disorder, remission status unspecified .  Could not tolerate Depakote and was changed to Seroquel q.h.s.       Plan:       Dakota was seen today for new med request.    Diagnoses and all orders for this visit:    Bipolar affective disorder, remission status unspecified  -     QUEtiapine (SEROQUEL) 50 MG tablet; Take 1 tablet (50 mg total) by mouth every evening.  -     Ambulatory referral/consult to Psychiatry; Future  -     busPIRone (BUSPAR) 10 MG tablet; Take 1 tablet (10 mg total) by mouth 3 (three) times daily.       Follow up in about 1 month (around 11/5/2022).

## 2022-10-05 NOTE — PROGRESS NOTES
Abnormal mammogram.  Please assure that Radiology will pursue follow-up diagnostics on this patient.

## 2022-10-10 ENCOUNTER — TELEPHONE (OUTPATIENT)
Dept: FAMILY MEDICINE | Facility: CLINIC | Age: 42
End: 2022-10-10

## 2022-10-10 NOTE — TELEPHONE ENCOUNTER
----- Message from Eros Robles MD sent at 10/5/2022  3:30 PM CDT -----  Abnormal mammogram.  Please assure that Radiology will pursue follow-up diagnostics on this patient.

## 2022-10-21 ENCOUNTER — PROCEDURE VISIT (OUTPATIENT)
Dept: SLEEP MEDICINE | Facility: HOSPITAL | Age: 42
End: 2022-10-21
Attending: INTERNAL MEDICINE
Payer: COMMERCIAL

## 2022-10-21 DIAGNOSIS — G47.33 OSA (OBSTRUCTIVE SLEEP APNEA): ICD-10-CM

## 2022-10-21 PROCEDURE — 95811 POLYSOM 6/>YRS CPAP 4/> PARM: CPT

## 2022-10-22 NOTE — PATIENT INSTRUCTIONS
CPAP was performed and pt was informed that her referring physician will call her with the results.

## 2022-10-26 ENCOUNTER — PATIENT MESSAGE (OUTPATIENT)
Dept: PSYCHIATRY | Facility: CLINIC | Age: 42
End: 2022-10-26
Payer: COMMERCIAL

## 2022-10-27 ENCOUNTER — TELEPHONE (OUTPATIENT)
Dept: PULMONOLOGY | Facility: HOSPITAL | Age: 42
End: 2022-10-27

## 2022-10-27 DIAGNOSIS — G47.33 OSA (OBSTRUCTIVE SLEEP APNEA): Primary | ICD-10-CM

## 2022-11-01 ENCOUNTER — OFFICE VISIT (OUTPATIENT)
Dept: FAMILY MEDICINE | Facility: CLINIC | Age: 42
End: 2022-11-01
Payer: COMMERCIAL

## 2022-11-01 VITALS
OXYGEN SATURATION: 97 % | HEART RATE: 63 BPM | DIASTOLIC BLOOD PRESSURE: 80 MMHG | WEIGHT: 186.88 LBS | TEMPERATURE: 98 F | HEIGHT: 61 IN | BODY MASS INDEX: 35.28 KG/M2 | SYSTOLIC BLOOD PRESSURE: 125 MMHG | RESPIRATION RATE: 18 BRPM

## 2022-11-01 DIAGNOSIS — F31.9 BIPOLAR AFFECTIVE DISORDER, REMISSION STATUS UNSPECIFIED: Primary | ICD-10-CM

## 2022-11-01 PROCEDURE — 99213 OFFICE O/P EST LOW 20 MIN: CPT | Mod: S$GLB,,, | Performed by: FAMILY MEDICINE

## 2022-11-01 PROCEDURE — 99213 PR OFFICE/OUTPT VISIT, EST, LEVL III, 20-29 MIN: ICD-10-PCS | Mod: S$GLB,,, | Performed by: FAMILY MEDICINE

## 2022-11-01 RX ORDER — CITALOPRAM 40 MG/1
40 TABLET, FILM COATED ORAL DAILY
Qty: 90 TABLET | Refills: 3 | Status: SHIPPED | OUTPATIENT
Start: 2022-11-01 | End: 2023-08-14 | Stop reason: SDUPTHER

## 2022-11-01 RX ORDER — OLANZAPINE 5 MG/1
5 TABLET ORAL NIGHTLY
Qty: 30 TABLET | Refills: 11 | Status: SHIPPED | OUTPATIENT
Start: 2022-11-01 | End: 2023-11-01

## 2022-11-01 NOTE — PROGRESS NOTES
"Subjective:       Patient ID: Dakota Scanlon is a 42 y.o. female.    Chief Complaint: No chief complaint on file.       Patient is here with medication questions she was prescribe  Quetipine but had an allergic reaction to it after 3 days and had to stop.   She has bipolar disorder and needs to take and alternative.  Patient has sulfa allergy and we weighed the options.        Allergies and Medications:   Review of patient's allergies indicates:   Allergen Reactions    Amoxicillin     Depakote [divalproex] Diarrhea    Lorazepam Other (See Comments)     "they couldn't get me to wake up"    Sulfa (sulfonamide antibiotics)      Current Outpatient Medications   Medication Sig Dispense Refill    albuterol (VENTOLIN HFA) 90 mcg/actuation inhaler Inhale 2 puffs into the lungs every 6 (six) hours as needed for Wheezing. Rescue 18 g 0    busPIRone (BUSPAR) 10 MG tablet Take 1 tablet (10 mg total) by mouth 3 (three) times daily. 90 tablet 5    clotrimazole-betamethasone 1-0.05% (LOTRISONE) cream Apply topically.      fluticasone furoate-vilanteroL (BREO ELLIPTA) 100-25 mcg/dose diskus inhaler Inhale 1 puff into the lungs once daily. Controller 30 each 11    HYDROcodone-acetaminophen (NORCO) 5-325 mg per tablet Take 1 tablet by mouth every 6 (six) hours as needed for Pain. 12 tablet 0    loratadine (CLARITIN) 10 mg tablet Take 10 mg by mouth once daily.      medroxyPROGESTERone (PROVERA) 10 MG tablet Take 10 mg by mouth once daily.      metoprolol succinate (TOPROL-XL) 50 MG 24 hr tablet Take 50 mg by mouth once daily.       midodrine (PROAMATINE) 10 MG tablet Take 10 mg by mouth once daily.       montelukast (SINGULAIR) 10 mg tablet Take 1 tablet (10 mg total) by mouth every evening. 90 tablet 3    omeprazole (PRILOSEC) 40 MG capsule Take 40 mg by mouth every morning.       sildenafil (REVATIO) 20 mg Tab Take 20 mg by mouth 3 (three) times daily.      tiZANidine (ZANAFLEX) 4 MG tablet Take 4 mg by mouth every 8 (eight) hours.   "     albuterol-ipratropium (DUO-NEB) 2.5 mg-0.5 mg/3 mL nebulizer solution Take 3 mLs by nebulization every 6 (six) hours as needed for Wheezing. 25 vial 2    citalopram (CELEXA) 40 MG tablet Take 1 tablet (40 mg total) by mouth once daily. 90 tablet 3    OLANZapine (ZYPREXA) 5 MG tablet Take 1 tablet (5 mg total) by mouth every evening. 30 tablet 11     No current facility-administered medications for this visit.       Family History:   Family History   Problem Relation Age of Onset    Hypertension Father     Diabetes Father     Heart disease Sister     Breast cancer Cousin        Social History:   Social History     Socioeconomic History    Marital status: Single   Tobacco Use    Smoking status: Every Day     Packs/day: 0.50     Types: Cigarettes    Smokeless tobacco: Never    Tobacco comments:     on the patch at this time but still smoking 4-5 cigs a day   Substance and Sexual Activity    Alcohol use: Yes     Comment: occasionaly     Drug use: Never       Review of Systems    Objective:     Vitals:    11/01/22 1011   BP: 125/80   Pulse: 63   Resp: 18   Temp: 97.9 °F (36.6 °C)        Physical Exam  Vitals and nursing note reviewed.   Constitutional:       General: She is in acute distress.      Appearance: Normal appearance. She is well-developed and normal weight. She is ill-appearing and toxic-appearing. She is not diaphoretic.   HENT:      Head: Normocephalic and atraumatic.   Eyes:      Pupils: Pupils are equal, round, and reactive to light.   Cardiovascular:      Rate and Rhythm: Normal rate and regular rhythm.      Heart sounds: Normal heart sounds. No murmur heard.    No friction rub. No gallop.   Pulmonary:      Effort: Pulmonary effort is normal. No respiratory distress.      Breath sounds: Normal breath sounds. No stridor. No wheezing, rhonchi or rales.   Chest:      Chest wall: No tenderness.   Musculoskeletal:      Cervical back: Normal range of motion and neck supple.      Right lower leg: No edema.       Left lower leg: No edema.   Neurological:      Mental Status: She is alert.   Psychiatric:         Behavior: Behavior normal.         Thought Content: Thought content normal.         Judgment: Judgment normal.       Assessment:       1. Bipolar affective disorder, remission status unspecified        Plan:       Diagnoses and all orders for this visit:    Bipolar affective disorder, remission status unspecified  -     OLANZapine (ZYPREXA) 5 MG tablet; Take 1 tablet (5 mg total) by mouth every evening.  -     citalopram (CELEXA) 40 MG tablet; Take 1 tablet (40 mg total) by mouth once daily.       Follow up in about 3 months (around 2/1/2023).

## 2023-01-10 ENCOUNTER — OFFICE VISIT (OUTPATIENT)
Dept: PULMONOLOGY | Facility: CLINIC | Age: 43
End: 2023-01-10
Payer: COMMERCIAL

## 2023-01-10 VITALS
DIASTOLIC BLOOD PRESSURE: 68 MMHG | SYSTOLIC BLOOD PRESSURE: 118 MMHG | TEMPERATURE: 98 F | OXYGEN SATURATION: 97 % | BODY MASS INDEX: 39.7 KG/M2 | HEART RATE: 76 BPM | WEIGHT: 210.13 LBS

## 2023-01-10 DIAGNOSIS — Z72.0 TOBACCO ABUSE: ICD-10-CM

## 2023-01-10 DIAGNOSIS — J44.9 CHRONIC OBSTRUCTIVE PULMONARY DISEASE, UNSPECIFIED COPD TYPE: ICD-10-CM

## 2023-01-10 DIAGNOSIS — G47.33 OSA (OBSTRUCTIVE SLEEP APNEA): ICD-10-CM

## 2023-01-10 DIAGNOSIS — R63.5 WEIGHT GAIN: ICD-10-CM

## 2023-01-10 DIAGNOSIS — I27.22 PULMONARY HYPERTENSION DUE TO LEFT HEART DISEASE: Primary | ICD-10-CM

## 2023-01-10 PROCEDURE — 99214 OFFICE O/P EST MOD 30 MIN: CPT | Mod: S$GLB,,, | Performed by: INTERNAL MEDICINE

## 2023-01-10 PROCEDURE — 99214 PR OFFICE/OUTPT VISIT, EST, LEVL IV, 30-39 MIN: ICD-10-PCS | Mod: S$GLB,,, | Performed by: INTERNAL MEDICINE

## 2023-01-10 NOTE — PATIENT INSTRUCTIONS
Follow up in about 6 months (around 7/10/2023).  Continue CPAP nightly  Talk to Dr. Ordaz about weight gain and Zyprexa  Stop smoking  Lose weight

## 2023-01-10 NOTE — PROGRESS NOTES
"    SUBJECTIVE:    Patient ID: Dakota Scanlon is a 42 y.o. female.    Chief Complaint: Follow-up and Apnea (3 month follow up ENA)      HPI The patient is here with good compliance with her CPAP.  She has an AHI of 0.7.  She is 93% compliant. She has pulmonary hypertension.  She is smoking <1/2 ppd.  She is continuing to gain weight.  She is not dieting.  She is on Zyprexa.  Past Medical History:   Diagnosis Date    Arthritis     Asthma     Bipolar disorder     Endometriosis, unspecified     Nervous disorder     Paroxysmal atrial fibrillation      Past Surgical History:   Procedure Laterality Date    CYST REMOVAL Right     ovarie    LIGATION OF VEIN Right     leg     Family History   Problem Relation Age of Onset    Hypertension Father     Diabetes Father     Heart disease Sister     Breast cancer Cousin         Social History:   Marital Status: Single  Occupation: works in the [employer]  Alcohol History:  reports current alcohol use.  Tobacco History:  reports that she has been smoking cigarettes. She has been smoking an average of .5 packs per day. She has never used smokeless tobacco.  Drug History:  reports no history of drug use.    Review of patient's allergies indicates:   Allergen Reactions    Amoxicillin     Depakote [divalproex] Diarrhea    Lorazepam Other (See Comments)     "they couldn't get me to wake up"    Sulfa (sulfonamide antibiotics)        Current Outpatient Medications   Medication Sig Dispense Refill    albuterol (VENTOLIN HFA) 90 mcg/actuation inhaler Inhale 2 puffs into the lungs every 6 (six) hours as needed for Wheezing. Rescue 18 g 0    busPIRone (BUSPAR) 10 MG tablet Take 1 tablet (10 mg total) by mouth 3 (three) times daily. 90 tablet 5    citalopram (CELEXA) 40 MG tablet Take 1 tablet (40 mg total) by mouth once daily. 90 tablet 3    clotrimazole-betamethasone 1-0.05% (LOTRISONE) cream Apply topically.      fluticasone furoate-vilanteroL (BREO ELLIPTA) 100-25 mcg/dose diskus inhaler " Inhale 1 puff into the lungs once daily. Controller 30 each 11    HYDROcodone-acetaminophen (NORCO) 5-325 mg per tablet Take 1 tablet by mouth every 6 (six) hours as needed for Pain. 12 tablet 0    loratadine (CLARITIN) 10 mg tablet Take 10 mg by mouth once daily.      medroxyPROGESTERone (PROVERA) 10 MG tablet Take 10 mg by mouth once daily.      metoprolol succinate (TOPROL-XL) 50 MG 24 hr tablet Take 50 mg by mouth once daily.       midodrine (PROAMATINE) 10 MG tablet Take 10 mg by mouth once daily.       montelukast (SINGULAIR) 10 mg tablet Take 1 tablet (10 mg total) by mouth every evening. 90 tablet 3    OLANZapine (ZYPREXA) 5 MG tablet Take 1 tablet (5 mg total) by mouth every evening. 30 tablet 11    omeprazole (PRILOSEC) 40 MG capsule Take 40 mg by mouth every morning.       albuterol-ipratropium (DUO-NEB) 2.5 mg-0.5 mg/3 mL nebulizer solution Take 3 mLs by nebulization every 6 (six) hours as needed for Wheezing. 25 vial 2    sildenafil (REVATIO) 20 mg Tab Take 20 mg by mouth 3 (three) times daily.      tiZANidine (ZANAFLEX) 4 MG tablet Take 4 mg by mouth every 8 (eight) hours.        No current facility-administered medications for this visit.       Alpha-1 Antitrypsin:  Last PFT:   Last CT:    Review of Systems  General: Feeling  tired.    Eyes: Vision is not good.  ENT:  No sinusitis or pharyngitis.   Heart:: No chest pain or palpitations.  Lungs: she coughs upon awakening  GI: No Nausea, vomiting, constipation, diarrhea, or reflux.  : Nocturia -none any longer  Musculoskeletal: legs hurt  Skin: good  Neuro: No headaches or neuropathy.  Lymph: No edema or adenopathy.  Psych: depression is getting better  Endo: weight gain of another 25 pounds    OBJECTIVE:      /68 (BP Location: Left arm, Patient Position: Sitting, BP Method: Medium (Manual))   Pulse 76   Temp 97.9 °F (36.6 °C)   Wt 95.3 kg (210 lb 1.6 oz)   SpO2 97%   BMI 39.70 kg/m²     Physical Exam  GENERAL: Midaged patient in no  distress.  HEENT: Pupils equal and reactive. Extraocular movements intact. Nose intact.      Pharynx moist.  Maxillary incisors are rotten.  Mallampati 4  NECK: Supple.  16 3/4  HEART: Regular rate and rhythm. No murmur or gallop auscultated.  LUNGS: Clear to auscultation and percussion. Lung excursion symmetrical. No change in fremitus. No adventitial noises.  ABDOMEN: Bowel sounds present. Non-tender, no masses palpated.  EXTREMITIES: Normal muscle tone and joint movement, no cyanosis or clubbing.   LYMPHATICS: No adenopathy palpated, no edema.  SKIN: Dry, intact, no lesions. Erythematous especially at the chest  NEURO: Cranial nerves II-XII intact. Motor strength 5/5 bilaterally, upper and lower extremities.  PSYCH: Appropriate affect.      Assessment:       1. Pulmonary hypertension due to left heart disease    2. ENA (obstructive sleep apnea)    3. Tobacco abuse    4. Weight gain    5. Chronic obstructive pulmonary disease, unspecified COPD type          Plan:       Pulmonary hypertension due to left heart disease    ENA (obstructive sleep apnea)    Tobacco abuse    Weight gain    Chronic obstructive pulmonary disease, unspecified COPD type           Follow up in about 6 months (around 7/10/2023).  Continue CPAP nightly  Talk to Dr. Ordaz about weight gain and Zyprexa  Stop smoking  Lose weight

## 2023-01-11 ENCOUNTER — HOSPITAL ENCOUNTER (OUTPATIENT)
Dept: RADIOLOGY | Facility: HOSPITAL | Age: 43
Discharge: HOME | End: 2023-01-11
Attending: FAMILY MEDICINE
Payer: COMMERCIAL

## 2023-01-11 VITALS — WEIGHT: 210.13 LBS | HEIGHT: 61 IN | BODY MASS INDEX: 39.67 KG/M2

## 2023-01-11 DIAGNOSIS — R92.2 INCONCLUSIVE MAMMOGRAM: ICD-10-CM

## 2023-01-11 PROCEDURE — 77062 BREAST TOMOSYNTHESIS BI: CPT | Mod: TC,PO

## 2023-01-12 ENCOUNTER — TELEPHONE (OUTPATIENT)
Dept: FAMILY MEDICINE | Facility: CLINIC | Age: 43
End: 2023-01-12

## 2023-01-12 NOTE — TELEPHONE ENCOUNTER
----- Message from Eros Robles MD sent at 1/11/2023  1:54 PM CST -----  Results Ok, notify patient.

## 2023-02-07 ENCOUNTER — OFFICE VISIT (OUTPATIENT)
Dept: FAMILY MEDICINE | Facility: CLINIC | Age: 43
End: 2023-02-07
Payer: COMMERCIAL

## 2023-02-07 VITALS
RESPIRATION RATE: 18 BRPM | OXYGEN SATURATION: 98 % | TEMPERATURE: 98 F | HEIGHT: 61 IN | DIASTOLIC BLOOD PRESSURE: 68 MMHG | WEIGHT: 210 LBS | HEART RATE: 91 BPM | BODY MASS INDEX: 39.65 KG/M2 | SYSTOLIC BLOOD PRESSURE: 116 MMHG

## 2023-02-07 DIAGNOSIS — Z00.00 PREVENTATIVE HEALTH CARE: ICD-10-CM

## 2023-02-07 DIAGNOSIS — J45.20 MILD INTERMITTENT ASTHMA WITHOUT COMPLICATION: ICD-10-CM

## 2023-02-07 DIAGNOSIS — F31.9 BIPOLAR AFFECTIVE DISORDER, REMISSION STATUS UNSPECIFIED: Primary | ICD-10-CM

## 2023-02-07 PROCEDURE — 99214 PR OFFICE/OUTPT VISIT, EST, LEVL IV, 30-39 MIN: ICD-10-PCS | Mod: S$GLB,,, | Performed by: FAMILY MEDICINE

## 2023-02-07 PROCEDURE — 99214 OFFICE O/P EST MOD 30 MIN: CPT | Mod: S$GLB,,, | Performed by: FAMILY MEDICINE

## 2023-02-07 RX ORDER — LAMOTRIGINE 100 MG/1
100 TABLET ORAL DAILY
Qty: 30 TABLET | Refills: 11 | Status: SHIPPED | OUTPATIENT
Start: 2023-02-07 | End: 2024-02-07

## 2023-02-07 NOTE — PROGRESS NOTES
Subjective:       Patient ID: Dakota Scanlon is a 42 y.o. female.    Chief Complaint: Asthma and Manic Behavior      Is here for follow-up on asthma and bipolar disorder she says she uses her albuterol only as needed but has been using it more frequently she also uses her Breo only as needed but was instructed to do it as a maintenance every day.  Patient is also dependent on CPAP uses it nightly.  Wt Readings from Last 3 Encounters:  Wt Readings from Last 6 Encounters:  02/07/23 : 95.3 kg (210 lb)  01/11/23 : 95.3 kg (210 lb 1.6 oz)  01/10/23 : 95.3 kg (210 lb 1.6 oz)  11/01/22 : 84.8 kg (186 lb 14.4 oz)  10/05/22 : 83.5 kg (184 lb 1.4 oz)  10/05/22 : 84 kg (185 lb 1.6 oz)  Lab Results       Component                Value               Date                       WBC                      4.61                06/28/2022                 HGB                      14.1                06/28/2022                 HCT                      42.9                06/28/2022                 PLT                      254                 06/28/2022                 ALT                      16                  06/28/2022                 AST                      16                  06/28/2022                 NA                       139                 06/28/2022                 K                        3.9                 06/28/2022                 CL                       110                 06/28/2022                 CREATININE               0.8                 06/28/2022                 BUN                      13                  06/28/2022                 CO2                      18 (L)              06/28/2022                 TSH                      2.040               04/04/2022                      Asthma  She complains of sputum production. There is no frequent throat clearing, hemoptysis (green) or hoarse voice. Her past medical history is significant for asthma.     Allergies and Medications:   Review of patient's allergies  "indicates:   Allergen Reactions    Amoxicillin     Depakote [divalproex] Diarrhea    Lorazepam Other (See Comments)     "they couldn't get me to wake up"    Sulfa (sulfonamide antibiotics)      Current Outpatient Medications   Medication Sig Dispense Refill    albuterol (VENTOLIN HFA) 90 mcg/actuation inhaler Inhale 2 puffs into the lungs every 6 (six) hours as needed for Wheezing. Rescue 18 g 0    albuterol-ipratropium (DUO-NEB) 2.5 mg-0.5 mg/3 mL nebulizer solution Take 3 mLs by nebulization every 6 (six) hours as needed for Wheezing. 25 vial 2    busPIRone (BUSPAR) 10 MG tablet Take 1 tablet (10 mg total) by mouth 3 (three) times daily. 90 tablet 5    citalopram (CELEXA) 40 MG tablet Take 1 tablet (40 mg total) by mouth once daily. 90 tablet 3    clotrimazole-betamethasone 1-0.05% (LOTRISONE) cream Apply topically.      fluticasone furoate-vilanteroL (BREO ELLIPTA) 100-25 mcg/dose diskus inhaler Inhale 1 puff into the lungs once daily. Controller 30 each 11    loratadine (CLARITIN) 10 mg tablet Take 10 mg by mouth once daily.      metoprolol succinate (TOPROL-XL) 50 MG 24 hr tablet Take 50 mg by mouth once daily.       midodrine (PROAMATINE) 10 MG tablet Take 10 mg by mouth once daily.       montelukast (SINGULAIR) 10 mg tablet Take 1 tablet (10 mg total) by mouth every evening. 90 tablet 3    OLANZapine (ZYPREXA) 5 MG tablet Take 1 tablet (5 mg total) by mouth every evening. 30 tablet 11    omeprazole (PRILOSEC) 40 MG capsule Take 40 mg by mouth every morning.       tiZANidine (ZANAFLEX) 4 MG tablet Take 4 mg by mouth every 8 (eight) hours.       HYDROcodone-acetaminophen (NORCO) 5-325 mg per tablet Take 1 tablet by mouth every 6 (six) hours as needed for Pain. (Patient not taking: Reported on 2/7/2023) 12 tablet 0    lamoTRIgine (LAMICTAL) 100 MG tablet Take 1 tablet (100 mg total) by mouth once daily. 30 tablet 11    medroxyPROGESTERone (PROVERA) 10 MG tablet Take 10 mg by mouth once daily.      sildenafil " (REVATIO) 20 mg Tab Take 20 mg by mouth 3 (three) times daily.       No current facility-administered medications for this visit.       Family History:   Family History   Problem Relation Age of Onset    Hypertension Father     Diabetes Father     Heart disease Sister     Breast cancer Cousin        Social History:   Social History     Socioeconomic History    Marital status: Single   Tobacco Use    Smoking status: Every Day     Packs/day: 0.50     Types: Cigarettes    Smokeless tobacco: Never    Tobacco comments:     on the patch at this time but still smoking 4-5 cigs a day   Substance and Sexual Activity    Alcohol use: Yes     Comment: occasionaly     Drug use: Never       Review of Systems   HENT:  Negative for hoarse voice.    Respiratory:  Positive for sputum production. Negative for hemoptysis (green).      Objective:     Vitals:    02/07/23 0921   BP: 116/68   Pulse: 91   Resp: 18   Temp: 97.8 °F (36.6 °C)        Physical Exam  Vitals and nursing note reviewed.   Constitutional:       General: She is not in acute distress.     Appearance: Normal appearance. She is well-developed and normal weight. She is not ill-appearing, toxic-appearing or diaphoretic.   HENT:      Head: Normocephalic and atraumatic.   Eyes:      Pupils: Pupils are equal, round, and reactive to light.   Cardiovascular:      Rate and Rhythm: Normal rate and regular rhythm.      Heart sounds: Normal heart sounds. No murmur heard.    No friction rub. No gallop.   Pulmonary:      Effort: Pulmonary effort is normal. No respiratory distress.      Breath sounds: Normal breath sounds. No stridor. No wheezing, rhonchi or rales.   Chest:      Chest wall: No tenderness.   Musculoskeletal:      Right lower leg: No edema.      Left lower leg: No edema.   Neurological:      Mental Status: She is alert.   Psychiatric:         Behavior: Behavior normal.         Thought Content: Thought content normal.         Judgment: Judgment normal.       Assessment:        1. Bipolar affective disorder, remission status unspecified    2. Preventative health care        Plan:       Dakota was seen today for asthma and manic behavior.    Diagnoses and all orders for this visit:    Bipolar affective disorder, remission status unspecified  -     lamoTRIgine (LAMICTAL) 100 MG tablet; Take 1 tablet (100 mg total) by mouth once daily.  -     hCG, quantitative; Future    Preventative health care  -     Hemoglobin A1C; Future         Follow up in about 6 months (around 8/7/2023) for Asthma and bipolar disorder.

## 2023-02-07 NOTE — PATIENT INSTRUCTIONS
We understand that controlling diabetes can feel overwhelming at times. We are here to offer the tools and support to help you manage your diabetes and meet your health goals. Please reference the meal planning guide below.   My fitness Pal for calorie tracking  Diabetic Meal Planning    About this topic  Healthy eating is an important part of keeping your diabetes in control. A balanced diet along with your diabetic drugs will help you control your blood sugar. The right portions of healthy foods may help keep your sugar within your goal range. It may also help to lower or maintain your weight, manage cholesterol, the amount of fat in your blood, and blood pressure.      Image(s)    What will the results be?  Healthy eating may help you lower your blood sugar and keep it in a safe range. Keeping your blood sugar in a safe range may lower your chances for long term problems from your diabetes. You may be less likely to have damage to your kidneys, heart, eyes, or nerves.    What changes to diet are needed?  Healthy eating means:  Eating a range of foods from all food groups.  Eating the right size portions. Read the nutrition facts on each food you eat.  Eating meals and snacks at the same time each day. Do not skip a meal or snack. Skipping meals may cause your blood sugar to go too low, especially if you are on drugs for your diabetes. Skipping meals can also cause you to eat too much at the next meal.  Talk to your diabetes educator about making a personal meal plan for you. They can also help you eat the foods you like by modifying them to be healthier.    Who should use this diet?  This diet is helpful to people with high blood sugar or diabetes.    What foods are good to eat?  It is important to make a healthy meal. Eat a variety of different foods in the right portion.  Fill half of your plate with non-starchy vegetables. Non-starchy vegetables include: Broccoli, green beans, carrots, greens (zena, kale,  mustard, turnip), onions, tomatoes, asparagus, beets, cauliflower, celery, and cucumber. Non-starchy vegetables are high in fiber and low in carbohydrates. These will help keep you full for longer without raising your blood sugar.  Fill 1/4 of your plate with carbohydrates. Try to choose whole grain options. Whole-grain products have more fiber which will make you feel full. Carbohydrates include: Bread, rice, pasta, and starchy vegetables. Starchy vegetables include beans, potatoes, acorn squash, butternut squash, corn, and peas.  Fill 1/4 of your plate with protein. Choose low-fat cuts of meat like boneless, skinless chicken breast; pork loin; 90% lean beef; lean and skinless turkey meat; and fresh fish (not fried) such as tuna, salmon, or mackerel.  Add a low-fat or non-fat dairy product like 8 ounces (240 mL) of 1% or skim milk or 6 ounces (180 mL) of low-fat yogurt. Eat the recommended serving. Milk and yogurt have carbohydrates which raise your blood sugar.  Add a small piece of fruit or 1/2 cup (120 grams) of frozen or canned fruit. Choose canned fruits in juice or water. Fruits include apples, bananas, peaches, pears, pineapple, plums, and oranges. Eat the recommended serving. Fruit has carbohydrates which can raise your blood sugar.  Include healthy fats in your meal like olive oil, canola oil, avocado, and nuts.  Other good foods to include in your diet are:  Foods high in fiber. Adding fiber to your meals may help control your blood sugar and cholesterol levels. It may also help with weight loss and prevent belly problems. If you are younger than 50, it is recommended to get 25 to 38 grams per day. If you are older than 50, it is recommended to get 21 to 30 grams per day. Good sources of fiber include:  Nuts and seeds  Beans, peas, and other legumes  Whole-grain products  Fruits  Vegetables  Smart snacks in the right portion. Do not go too long in between meals. Ask your dietitian when you should have a  snack in between your meals. Snack on things like:  Nuts  Vegetables and low-fat dressing  Light popcorn  Low-fat cheese and crackers  1/2 sandwich    What foods should be limited or avoided?  You may need to limit the amount of some foods you eat and how often you eat them. Foods that should be limited include:  High fat or processed foods like:  Sanz  Sausage  Hot dogs  Whole-fat dairy products  Potato chips  Foods high in sodium like:  Canned soups  Soy sauce and some salad dressings  Cured meats  Salted snack foods like pretzels  Olives  Fats and oils like:  Margarine  Salad dressing  Gravy  Lard or shortening  Foods high in sugar like:  Candy  Cookies  Cake  Ice cream  Table sugar  Soda  Juice drinks  Starches that are not whole grain like:  White rice  French fries  White pasta  White bread  Sugary cereals  Baked goods, pastries, croissants  Beer, wine, and mixed drinks (alcohol). Drink alcohol in moderation (1 drink per day or less for adult women and 2 drinks per day or less for adult men). Drinking alcohol can cause fluctuations in your blood sugar and interfere with how your diabetic drugs work. Talk to your doctor about how you can safely include alcohol into your diet.    What can be done to prevent this health problem?  Some people have a higher chance of developing diabetes than others. This is a life-long problem. You can still lead a normal life. Diabetes can be managed through diet, exercise, and drugs. It is important to have support from your family and friends to help you with your goals.    When do I need to call the doctor?  Blood sugar level is above 240 mg/dL for more than a day  Blood sugar level drops to less than 40 and does not respond to 15 grams of simple carbohydrate, like 4 glucose tablets or 1/2 cup (120 mL) of fruit juice  Trouble breathing  Very sleepy and trouble concentrating  Feeling very thirsty  Needing to urinate (pee) more than normal  Throw up more than once or have many  loose stools  You are so sick you cannot eat or drink  Fever over 101°F (38°C)  Questions about your diet plan  You are not feeling better in 2 to 3 days or you are feeling worse    Helpful tips  Plan ahead. Plan your meals and grocery list before going to the store. This will help you to choose foods that are good for you.  Pack a lunch. Take healthy meals and snacks with you to work.  Keep a food journal to help keep you on track. Take note of foods that keep your blood sugar in goal range. Also note foods and meals that raise or lower your blood sugar. There are apps for your phone that can help with this.  Visit a restaurant's website before eating out. You can see the menu options and nutritional facts. This way, you can see which items best fit into your diet plan. Call ahead if you have questions.    Disclaimer.This generalized information is a limited summary of diagnosis, treatment, and/or medication information. It is not meant to be comprehensive and should be used as a tool to help the user understand and/or assess potential diagnostic and treatment options. It does NOT include all information about conditions, treatments, medications, side effects, or risks that may apply to a specific patient. It is not intended to be medical advice or a substitute for the medical advice, diagnosis, or treatment of a health care provider based on the health care provider's examination and assessment of a patient's specific and unique circumstances. Patients must speak with a health care provider for complete information about their health, medical questions, and treatment options, including any risks or benefits regarding use of medications. This information does not endorse any treatments or medications as safe, effective, or approved for treating a specific patient. [employer] and its affiliates disclaim any warranty or liability relating to this information or the use thereof. The use of this information is  governed by the Terms of Use, available at Terms of Use. ©2022 [employer] and its affiliates and/or licensors. All rights reserved. Avoid anything with sugar in the 1st 3 ingredients including honey and syrup.  Avoid dried fruits like raise nodes and apricots.  Other fruits and vegetables are okay but the sugar content is higher in bananas and grapes.  Avoid large portions on your starchy foods:  Bread rice potatoes and pasta.

## 2023-05-15 PROBLEM — Z00.00 PREVENTATIVE HEALTH CARE: Status: RESOLVED | Noted: 2023-02-07 | Resolved: 2023-05-15

## 2023-07-31 ENCOUNTER — TELEPHONE (OUTPATIENT)
Dept: FAMILY MEDICINE | Facility: CLINIC | Age: 43
End: 2023-07-31

## 2023-07-31 DIAGNOSIS — F31.9 BIPOLAR AFFECTIVE DISORDER, REMISSION STATUS UNSPECIFIED: ICD-10-CM

## 2023-07-31 RX ORDER — BUSPIRONE HYDROCHLORIDE 10 MG/1
10 TABLET ORAL 3 TIMES DAILY
Qty: 270 TABLET | Refills: 1 | Status: SHIPPED | OUTPATIENT
Start: 2023-07-31

## 2024-01-28 ENCOUNTER — OFFICE VISIT (OUTPATIENT)
Dept: URGENT CARE | Facility: CLINIC | Age: 44
End: 2024-01-28
Payer: COMMERCIAL

## 2024-01-28 VITALS
HEART RATE: 67 BPM | WEIGHT: 210 LBS | BODY MASS INDEX: 39.65 KG/M2 | SYSTOLIC BLOOD PRESSURE: 102 MMHG | DIASTOLIC BLOOD PRESSURE: 70 MMHG | HEIGHT: 61 IN | TEMPERATURE: 98 F | OXYGEN SATURATION: 95 % | RESPIRATION RATE: 18 BRPM

## 2024-01-28 DIAGNOSIS — R05.9 COUGH, UNSPECIFIED TYPE: Primary | ICD-10-CM

## 2024-01-28 DIAGNOSIS — J06.9 UPPER RESPIRATORY TRACT INFECTION, UNSPECIFIED TYPE: ICD-10-CM

## 2024-01-28 LAB
CTP QC/QA: YES
CTP QC/QA: YES
FLUAV AG NPH QL: NEGATIVE
FLUBV AG NPH QL: NEGATIVE
SARS-COV-2 AG RESP QL IA.RAPID: NEGATIVE

## 2024-01-28 PROCEDURE — 87811 SARS-COV-2 COVID19 W/OPTIC: CPT | Mod: QW,S$GLB,, | Performed by: NURSE PRACTITIONER

## 2024-01-28 PROCEDURE — 99204 OFFICE O/P NEW MOD 45 MIN: CPT | Mod: 25,S$GLB,, | Performed by: NURSE PRACTITIONER

## 2024-01-28 PROCEDURE — 87804 INFLUENZA ASSAY W/OPTIC: CPT | Mod: QW,,, | Performed by: NURSE PRACTITIONER

## 2024-01-28 RX ORDER — PROMETHAZINE HYDROCHLORIDE AND DEXTROMETHORPHAN HYDROBROMIDE 6.25; 15 MG/5ML; MG/5ML
5 SYRUP ORAL EVERY 6 HOURS PRN
Qty: 118 ML | Refills: 0 | Status: SHIPPED | OUTPATIENT
Start: 2024-01-28 | End: 2024-02-07

## 2024-01-28 RX ORDER — AZITHROMYCIN 250 MG/1
TABLET, FILM COATED ORAL
Qty: 6 TABLET | Refills: 0 | Status: SHIPPED | OUTPATIENT
Start: 2024-01-28 | End: 2024-05-15 | Stop reason: ALTCHOICE

## 2024-01-28 RX ORDER — ALBUTEROL SULFATE 0.83 MG/ML
2.5 SOLUTION RESPIRATORY (INHALATION) EVERY 6 HOURS PRN
Qty: 75 ML | Refills: 0 | Status: SHIPPED | OUTPATIENT
Start: 2024-01-28 | End: 2025-01-27

## 2024-01-28 NOTE — PROGRESS NOTES
Subjective:      Patient ID: Dakota Scanlon is a 43 y.o. female.    Vitals:  vitals were not taken for this visit.     Chief Complaint: Cough    Cough  This is a new problem. The problem has been unchanged. The problem occurs every few minutes. The cough is Productive of purulent sputum. Associated symptoms include chills, headaches, nasal congestion and a sore throat. She has tried OTC cough suppressant for the symptoms. The treatment provided mild relief.     Constitution: Positive for chills.   HENT:  Positive for sore throat.    Respiratory:  Positive for cough.    Neurological:  Positive for headaches.    Objective:     Physical Exam    Assessment:     No diagnosis found.    Plan:       There are no diagnoses linked to this encounter.

## 2024-01-28 NOTE — PROGRESS NOTES
CHIEF COMPLAINT  Chief Complaint   Patient presents with    Cough       HPI  Dakota Weber a 43 y.o. female who presents with c/o sore throat, productive cough, wheezing, headache and congestion for 1 week. Pt reports she has taken mucinex, dayquil and nyquil with no relief. Pmhx of Asthma, pt reports she has been using inhaler and nebulizer several times a day. Last nebulizer treatment was this morning. Pt request refill of albuterol for nebulizer. Pt denies fever, rash, chest pain, sob, abdominal pain, n/v/d.       CURRENT MEDICATIONS  Current Outpatient Medications on File Prior to Visit   Medication Sig Dispense Refill    albuterol (PROVENTIL/VENTOLIN HFA) 90 mcg/actuation inhaler Inhale 2 puffs into the lungs every 6 (six) hours as needed for Wheezing. Rescue 18 g 0    busPIRone (BUSPAR) 10 MG tablet TAKE 1 TABLET BY MOUTH THREE TIMES A  tablet 1    clotrimazole-betamethasone 1-0.05% (LOTRISONE) cream Apply topically.      HYDROcodone-acetaminophen (NORCO) 5-325 mg per tablet Take 1 tablet by mouth every 6 (six) hours as needed for Pain. 12 tablet 0    lamoTRIgine (LAMICTAL) 100 MG tablet Take 1 tablet (100 mg total) by mouth once daily. 30 tablet 11    loratadine (CLARITIN) 10 mg tablet Take 10 mg by mouth once daily.      medroxyPROGESTERone (PROVERA) 10 MG tablet Take 10 mg by mouth once daily.      metoprolol succinate (TOPROL-XL) 50 MG 24 hr tablet Take 50 mg by mouth once daily.       midodrine (PROAMATINE) 10 MG tablet Take 10 mg by mouth once daily.       omeprazole (PRILOSEC) 40 MG capsule Take 40 mg by mouth every morning.       sildenafil (REVATIO) 20 mg Tab Take 20 mg by mouth 3 (three) times daily.      albuterol-ipratropium (DUO-NEB) 2.5 mg-0.5 mg/3 mL nebulizer solution Take 3 mLs by nebulization every 6 (six) hours as needed for Wheezing. 25 vial 2    citalopram (CELEXA) 40 MG tablet Take 1 tablet (40 mg total) by mouth once daily. 90 tablet 0    fluticasone furoate-vilanteroL (BREO  "ELLIPTA) 100-25 mcg/dose diskus inhaler Inhale 1 puff into the lungs once daily. Controller 30 each 11    OLANZapine (ZYPREXA) 5 MG tablet Take 1 tablet (5 mg total) by mouth every evening. 30 tablet 11    tiZANidine (ZANAFLEX) 4 MG tablet Take 4 mg by mouth every 8 (eight) hours.        No current facility-administered medications on file prior to visit.       ALLERGIES  Review of patient's allergies indicates:   Allergen Reactions    Amoxicillin     Depakote [divalproex] Diarrhea    Lorazepam Other (See Comments)     "they couldn't get me to wake up"    Sulfa (sulfonamide antibiotics)        Immunization History   Administered Date(s) Administered    COVID-19, MRNA, LN-S, PF (Pfizer) (Purple Cap) 05/21/2021, 06/11/2021    Influenza - Quadrivalent - PF *Preferred* (6 months and older) 10/05/2022    Pneumococcal Conjugate - 20 Valent 09/01/2022       PAST MEDICAL HISTORY  Past Medical History:   Diagnosis Date    Arthritis     Asthma     Bipolar disorder     Endometriosis, unspecified     Nervous disorder     Paroxysmal atrial fibrillation        SURGICAL HISTORY  Past Surgical History:   Procedure Laterality Date    CYST REMOVAL Right     ovarie    LIGATION OF VEIN Right     leg       SOCIAL HISTORY  Social History     Socioeconomic History    Marital status: Single   Tobacco Use    Smoking status: Every Day     Current packs/day: 0.50     Types: Cigarettes    Smokeless tobacco: Never    Tobacco comments:     on the patch at this time but still smoking 4-5 cigs a day   Substance and Sexual Activity    Alcohol use: Yes     Comment: occasionaly     Drug use: Never       FAMILY HISTORY  Family History   Problem Relation Age of Onset    Hypertension Father     Diabetes Father     Heart disease Sister     Breast cancer Cousin        REVIEW OF SYSTEMS  Constitutional: No fever, chills, or weakness.  Eyes: No redness, pain, or discharge  HENT: No ear pain, + headache,+ congestion,  + throat pain  Respiratory: +productive " cough, + wheezing no shortness of breath  Cardiovascular: No chest pain, palpitations or edema    All systems otherwise negative except as noted in the Review of Systems and History of Present Illness      PHYSICAL EXAM  Reviewed Triage Note  VITAL SIGNS:102/70  Constitutional: Well developed, well nourished, Alert and oriented x3, No acute distress, non-toxic appearance.  HENT: Normocephalic, Atraumatic, Bilateral external ears normal, bilateral ear canals clear,  external nose negative, oropharynx moist, No oral exudates, edema or erythema  Eyes: PERRL, EOMI, Conjunctiva normal, No discharge.  Neck: Normal range of motion, no tenderness, supple, no carotid bruits  Respiratory: Normal breath sounds, no respiratory distress, no wheezing, no rhonchi, no rales  Cardiovascular: HR 67, normal rhythm, no murmurs, no rubs, no gallops.        LABS  Pertinent labs reviewed. (see chart for details)  Flu negative  Covid negative        MEDICAL DECISION MAKING    Physical exam findings and lab results discussed with patient. No acute emergent medical condition identified at this time to warrant further testing. Will dispo home with instructions to follow up with PCP tomorrow. Script for zpack, promethazine-dm, and albuterol sent to pharmacy of choice with instructions on  usage. Pt agrees with plan of care.     DISPOSITION  Patient discharged in stable condition   CLINICAL IMPRESSION:  The primary encounter diagnosis was Cough, unspecified type. A diagnosis of Upper respiratory tract infection, unspecified type was also pertinent to this visit.    Patient advised to follow-up with your PCP within 3 days for BP re-check if Blood Pressure was >120/80 without history of hypertension.

## 2024-05-20 ENCOUNTER — TELEPHONE (OUTPATIENT)
Dept: FAMILY MEDICINE | Facility: CLINIC | Age: 44
End: 2024-05-20
Payer: COMMERCIAL

## 2024-05-20 NOTE — TELEPHONE ENCOUNTER
----- Message from Eros Robles MD sent at 5/17/2024  3:48 PM CDT -----  Lab reviewed: all OK. Please notify pt. Continue pres meds.

## 2024-06-28 DIAGNOSIS — F31.9 BIPOLAR AFFECTIVE DISORDER, REMISSION STATUS UNSPECIFIED: ICD-10-CM

## 2024-06-28 RX ORDER — CITALOPRAM 40 MG/1
40 TABLET, FILM COATED ORAL DAILY
Qty: 90 TABLET | Refills: 0 | Status: SHIPPED | OUTPATIENT
Start: 2024-06-28 | End: 2024-09-26

## 2024-07-09 ENCOUNTER — PATIENT MESSAGE (OUTPATIENT)
Dept: ADMINISTRATIVE | Facility: HOSPITAL | Age: 44
End: 2024-07-09
Payer: COMMERCIAL

## 2024-08-29 ENCOUNTER — TELEPHONE (OUTPATIENT)
Dept: PULMONOLOGY | Facility: CLINIC | Age: 44
End: 2024-08-29
Payer: COMMERCIAL

## 2024-08-30 ENCOUNTER — PATIENT OUTREACH (OUTPATIENT)
Dept: FAMILY MEDICINE | Facility: CLINIC | Age: 44
End: 2024-08-30
Payer: COMMERCIAL

## 2024-08-30 NOTE — TELEPHONE ENCOUNTER
Discharge Information     Discharge Date:   8/29/24    Primary Discharge Diagnosis:  Asthma      Discharge Summary:  Unavailable      Medication & Order Review     Were medication changes made or new medications added?   No    If so, has the patient filled the prescriptions?  NA     Was Home Health ordered? No    If so, has Home Health contacted patient and/or initiated services?  NA    Name of Home Health Agency? N/A    Durable Medical Equipment ordered?  No     If so, has the DME provider contacted patient and delivered equipment?  N/A    Follow Up               Any problems since discharge? No    How is the patient feeling since returning home?  Still Wheezing    Have you set up recommended follow up appointments?  (cardiology, surgery, etc.)  NA  Schedule Hospital Follow-up appointment within 7-14 days (preferably 7). No Appointments until 9/20/24     Notes:  Patient states she is still feeling bad. She doesn't think there was enough done for her at the hospital.  She has an appointment with pulmonology on 9/18/24.            Brittany Garvin

## 2024-09-01 ENCOUNTER — PATIENT MESSAGE (OUTPATIENT)
Dept: PULMONOLOGY | Facility: CLINIC | Age: 44
End: 2024-09-01
Payer: COMMERCIAL

## 2024-09-18 ENCOUNTER — OFFICE VISIT (OUTPATIENT)
Dept: PULMONOLOGY | Facility: CLINIC | Age: 44
End: 2024-09-18
Payer: COMMERCIAL

## 2024-09-18 VITALS
HEART RATE: 91 BPM | SYSTOLIC BLOOD PRESSURE: 118 MMHG | OXYGEN SATURATION: 95 % | HEIGHT: 61 IN | BODY MASS INDEX: 39.16 KG/M2 | WEIGHT: 207.38 LBS | DIASTOLIC BLOOD PRESSURE: 64 MMHG

## 2024-09-18 DIAGNOSIS — Z72.0 TOBACCO ABUSE: ICD-10-CM

## 2024-09-18 DIAGNOSIS — R05.3 CHRONIC COUGH: ICD-10-CM

## 2024-09-18 DIAGNOSIS — E66.9 OBESITY (BMI 35.0-39.9 WITHOUT COMORBIDITY): ICD-10-CM

## 2024-09-18 DIAGNOSIS — J45.909 ASTHMA, UNSPECIFIED ASTHMA SEVERITY, UNSPECIFIED WHETHER COMPLICATED, UNSPECIFIED WHETHER PERSISTENT: Primary | ICD-10-CM

## 2024-09-18 PROCEDURE — 99999 PR PBB SHADOW E&M-EST. PATIENT-LVL III: CPT | Mod: PBBFAC,,, | Performed by: INTERNAL MEDICINE

## 2024-09-18 PROCEDURE — 99214 OFFICE O/P EST MOD 30 MIN: CPT | Mod: S$GLB,,, | Performed by: INTERNAL MEDICINE

## 2024-09-18 RX ORDER — ALBUTEROL SULFATE 90 UG/1
2 INHALANT RESPIRATORY (INHALATION) EVERY 6 HOURS PRN
Qty: 18 G | Refills: 11 | Status: SHIPPED | OUTPATIENT
Start: 2024-09-18 | End: 2025-09-18

## 2024-09-18 RX ORDER — ALBUTEROL SULFATE 0.83 MG/ML
2.5 SOLUTION RESPIRATORY (INHALATION) EVERY 6 HOURS PRN
Qty: 120 ML | Refills: 11 | Status: SHIPPED | OUTPATIENT
Start: 2024-09-18 | End: 2025-09-18

## 2024-09-18 NOTE — PROGRESS NOTES
SUBJECTIVE:    Patient ID: Dakota Scanlon is a 44 y.o. female.    Chief Complaint: Follow-up (Follow up hypertension due to left heart disease, ENA)      Follow-up      The was hospitalized with a COPD exacerbation in late August. The patient perceives she is asthmatic.  However, the patient continues to smoke; she likely has COPD.   She is smoking 7 cigarettes a day..  The patient has albuterol both as a nebulizer and an MDI.  She did not realize they with the same medicine and she has been using both.  Reviewed technique.  She had PFTs in 2022 which showed severe obstruction then.  She follows up very intermittently.    Insofar as her CPAP goes her compliance is 83%.  Of those 25 days that she wore it she spent 70% of those nights with 4 hours of use or more.  Her AHI is 1.  Even though her CPAP is still set on 9 it is obviously adequate when she wears it.  She was supposed to be increased to 12 2 years ago but this obviously did not happen and given her AHI am not going to push the issue at this time.    Past Medical History:   Diagnosis Date    Anxiety     Arthritis     Asthma     Bipolar disorder     Endometriosis, unspecified     GERD (gastroesophageal reflux disease)     Nervous disorder     Paroxysmal atrial fibrillation     Sleep apnea     Uses CPAP     Past Surgical History:   Procedure Laterality Date    CYST REMOVAL Right     ovarie    IVUS (INTRAVASCULAR ULTRASOUND)  5/17/2024    Procedure: IVUS Lt. Leg and IVC;  Surgeon: Gabe Venegas MD;  Location: [facility] CATH;  Service: Cardiology;;    LIGATION OF VEIN Right     leg    PHLEBOGRAPHY  5/17/2024    Procedure: IVC Venogram;  Surgeon: Gabe Venegas MD;  Location: [facility]ATH;  Service: Cardiology;;    RIGHT HEART CATHETERIZATION  5/17/2024    Procedure: Right heart cath;  Surgeon: Gabe Venegas MD;  Location: [facility] CATH;  Service: Cardiology;;    VENOGRAM, LOWER EXTREMITY, UNILATERAL  5/17/2024    Procedure: Lt. Leg Venogram;  Surgeon: Gabe Venegas  "MD NAMRATA;  Location: [facility];  Service: Cardiology;;     Family History   Problem Relation Name Age of Onset    Hypertension Mother      Hypertension Father      Diabetes Father      Melanoma Father      Pacemaker/defibrilator Father      Heart disease Sister      Breast cancer Cousin          Social History:   Marital Status: Single  Occupation: works in the [employer]  Alcohol History:  reports current alcohol use.  Tobacco History:  reports that she has been smoking cigarettes. She has never used smokeless tobacco.  Drug History:  reports no history of drug use.    Review of patient's allergies indicates:   Allergen Reactions    Amoxicillin     Depakote [divalproex] Diarrhea    Lorazepam Other (See Comments)     "they couldn't get me to wake up"    Sulfa (sulfonamide antibiotics) Other (See Comments)     Tachycardia       Current Outpatient Medications   Medication Sig Dispense Refill    busPIRone (BUSPAR) 10 MG tablet TAKE 1 TABLET BY MOUTH THREE TIMES A  tablet 1    citalopram (CELEXA) 40 MG tablet Take 1 tablet (40 mg total) by mouth once daily. 90 tablet 0    clotrimazole-betamethasone 1-0.05% (LOTRISONE) cream Apply topically.      ethacrynic acid (EDECRIN) 25 mg Tab Take 25 mg by mouth once daily.      loratadine (CLARITIN) 10 mg tablet Take 10 mg by mouth once daily.      metoprolol succinate (TOPROL-XL) 50 MG 24 hr tablet Take 50 mg by mouth once daily.       midodrine (PROAMATINE) 10 MG tablet Take 10 mg by mouth once daily.       omeprazole (PRILOSEC) 40 MG capsule Take 40 mg by mouth every morning.       albuterol (PROVENTIL) 2.5 mg /3 mL (0.083 %) nebulizer solution Take 3 mLs (2.5 mg total) by nebulization every 6 (six) hours as needed for Wheezing. Rescue 120 mL 11    albuterol (PROVENTIL/VENTOLIN HFA) 90 mcg/actuation inhaler Inhale 2 puffs into the lungs every 6 (six) hours as needed for Wheezing. Rescue 18 g 11    fluticasone furoate-vilanteroL (BREO ELLIPTA) 100-25 mcg/dose diskus " "inhaler Inhale 1 puff into the lungs once daily. Controller 30 each 11    lamoTRIgine (LAMICTAL) 100 MG tablet Take 1 tablet (100 mg total) by mouth once daily. (Patient not taking: Reported on 5/15/2024) 30 tablet 11    sildenafil (REVATIO) 20 mg Tab Take 20 mg by mouth 3 (three) times daily. (Patient not taking: Reported on 9/18/2024)      tiZANidine (ZANAFLEX) 4 MG tablet Take 4 mg by mouth every 8 (eight) hours.  (Patient not taking: Reported on 9/18/2024)       No current facility-administered medications for this visit.     Facility-Administered Medications Ordered in Other Visits   Medication Dose Route Frequency Provider Last Rate Last Admin    0.9%  NaCl infusion   Intravenous Continuous Gabe Venegas MD 50 mL/hr at 05/17/24 1310 New Bag at 05/17/24 1310       Alpha-1 Antitrypsin:  Last PFT: 1/5/22 Severe obstruction, mild restriction, and a mild diffusion defect.  Last CT:    Review of Systems  General: Feeling  short of breath.  Eyes: Vision is ok  ENT:  No sinusitis or pharyngitis.   Heart:: Has palpitations.  Lungs: she is having shortness of breath, coughs at night and first in the morning, mucus is clear to green  GI: No Nausea, vomiting, constipation, diarrhea, or reflux.  : No  nocturia  Musculoskeletal: no problems  Skin: good  Neuro: No headaches or neuropathy.  Lymph: No edema or adenopathy.  Psych: depression is doing "good"  Endo: weight stable    OBJECTIVE:      /64 (BP Location: Right arm, Patient Position: Sitting, BP Method: Medium (Manual))   Pulse 91   Ht 5' 1" (1.549 m)   Wt 94.1 kg (207 lb 6.4 oz)   SpO2 95%   BMI 39.19 kg/m²     Physical Exam  GENERAL: Midaged patient in no distress.  HEENT: Pupils equal and reactive. Extraocular movements intact. Nose intact.      Pharynx moist.  Maxillary incisors are rotten.  Mallampati 4  NECK: Supple.  16 3/4  HEART: Regular rate and rhythm. No murmur or gallop auscultated.  LUNGS: Clear to auscultation and percussion. Lung " excursion symmetrical. No change in fremitus. No adventitial noises.  ABDOMEN: Bowel sounds present. Non-tender, no masses palpated.  EXTREMITIES: Normal muscle tone and joint movement, no cyanosis or clubbing.   LYMPHATICS: No adenopathy palpated, no edema.  SKIN: Dry, intact, no lesions. Erythematous especially at the chest  NEURO: Cranial nerves II-XII intact. Motor strength 5/5 bilaterally, upper and lower extremities.  PSYCH: Appropriate affect.      Assessment:       1. Asthma, unspecified asthma severity, unspecified whether complicated, unspecified whether persistent    2. Chronic cough    3. Tobacco abuse    4. Obesity (BMI 35.0-39.9 without comorbidity)    The patient is most likely a COPD patient.  She continues to smoke which is making everything worse.  She coughs and is short of breath.  She produces sputum daily.  Will repeat PFTs and see where she is.  Will refer her to the smoking cessation program here in [place].  Have re-emphasized the extreme importance of her stopping smoking.  Will get the patient on controller meds once we see what her PFTs look like.  Her CPAP usage was stressed.  Both wearing it every night and wearing it for at least 4 hours.        Plan:       Asthma, unspecified asthma severity, unspecified whether complicated, unspecified whether persistent  -     Complete PFT with bronchodilator; Future  -     Ambulatory referral/consult to Smoking Cessation Program; Future; Expected date: 09/25/2024    Chronic cough  -     albuterol (PROVENTIL) 2.5 mg /3 mL (0.083 %) nebulizer solution; Take 3 mLs (2.5 mg total) by nebulization every 6 (six) hours as needed for Wheezing. Rescue  Dispense: 120 mL; Refill: 11    Tobacco abuse    Obesity (BMI 35.0-39.9 without comorbidity)    Other orders  -     albuterol (PROVENTIL/VENTOLIN HFA) 90 mcg/actuation inhaler; Inhale 2 puffs into the lungs every 6 (six) hours as needed for Wheezing. Rescue  Dispense: 18 g; Refill: 11             Follow up in  about 1 month (around 10/18/2024).  Continue CPAP nightly  Stop smoking  Lose weight  Refilled albuterol MDI and nebulizer.

## 2024-09-26 ENCOUNTER — HOSPITAL ENCOUNTER (OUTPATIENT)
Dept: PULMONOLOGY | Facility: HOSPITAL | Age: 44
Discharge: HOME | End: 2024-09-26
Attending: INTERNAL MEDICINE
Payer: COMMERCIAL

## 2024-09-26 ENCOUNTER — TELEPHONE (OUTPATIENT)
Dept: PULMONOLOGY | Facility: CLINIC | Age: 44
End: 2024-09-26

## 2024-09-26 ENCOUNTER — TELEPHONE (OUTPATIENT)
Dept: PULMONOLOGY | Facility: HOSPITAL | Age: 44
End: 2024-09-26

## 2024-09-26 DIAGNOSIS — F31.9 BIPOLAR AFFECTIVE DISORDER, REMISSION STATUS UNSPECIFIED: ICD-10-CM

## 2024-09-26 DIAGNOSIS — J45.909 ASTHMA, UNSPECIFIED ASTHMA SEVERITY, UNSPECIFIED WHETHER COMPLICATED, UNSPECIFIED WHETHER PERSISTENT: ICD-10-CM

## 2024-09-26 LAB
DLCO SINGLE BREATH LLN: 17.49
DLCO SINGLE BREATH PRE REF: 56.8 %
DLCO SINGLE BREATH REF: 23.23
DLCOC SBVA LLN: 3.49
DLCOC SBVA REF: 5.24
DLCOC SINGLE BREATH LLN: 17.49
DLCOC SINGLE BREATH REF: 23.23
DLCOVA LLN: 3.49
DLCOVA PRE REF: 101.2 %
DLCOVA PRE: 5.3 ML/(MIN*MMHG*L) (ref 3.49–6.98)
DLCOVA REF: 5.24
ERVN2 LLN: -16448.99
ERVN2 PRE REF: 9 %
ERVN2 PRE: 0.09 L (ref -16448.99–16451.01)
ERVN2 REF: 1.01
FEF 25 75 CHG: 32.2 %
FEF 25 75 LLN: 1.96
FEF 25 75 POST REF: 27.3 %
FEF 25 75 PRE REF: 20.7 %
FEF 25 75 REF: 3.36
FET100 CHG: -0.5 %
FEV1 CHG: 14.4 %
FEV1 FVC CHG: 7.5 %
FEV1 FVC LLN: 71
FEV1 FVC POST REF: 90.5 %
FEV1 FVC PRE REF: 84.2 %
FEV1 FVC REF: 82
FEV1 LLN: 2.08
FEV1 POST REF: 45.3 %
FEV1 PRE REF: 39.6 %
FEV1 REF: 2.63
FRCN2 LLN: 1.69
FRCN2 PRE REF: 45.7 %
FRCN2 REF: 2.51
FVC CHG: 6.4 %
FVC LLN: 2.56
FVC POST REF: 49.9 %
FVC PRE REF: 46.9 %
FVC REF: 3.22
IVC PRE: 1.69 L (ref 2.56–3.92)
IVC SINGLE BREATH LLN: 2.56
IVC SINGLE BREATH PRE REF: 52.4 %
IVC SINGLE BREATH REF: 3.22
PEF CHG: 34.9 %
PEF LLN: 4.9
PEF POST REF: 37.1 %
PEF PRE REF: 27.5 %
PEF REF: 6.45
POST FEF 25 75: 0.92 L/S (ref 1.96–4.76)
POST FET 100: 6.67 SEC
POST FEV1 FVC: 74.12 % (ref 70.84–91.03)
POST FEV1: 1.19 L (ref 2.08–3.16)
POST FVC: 1.61 L (ref 2.56–3.92)
POST PEF: 2.4 L/S (ref 4.9–8)
PRE DLCO: 13.2 ML/(MIN*MMHG) (ref 17.49–28.96)
PRE FEF 25 75: 0.69 L/S (ref 1.96–4.76)
PRE FET 100: 6.7 SEC
PRE FEV1 FVC: 68.97 % (ref 70.84–91.03)
PRE FEV1: 1.04 L (ref 2.08–3.16)
PRE FRC N2: 1.15 L (ref 1.69–3.34)
PRE FVC: 1.51 L (ref 2.56–3.92)
PRE PEF: 1.77 L/S (ref 4.9–8)
RVN2 LLN: 0.93
RVN2 PRE REF: 70.2 %
RVN2 PRE: 1.06 L (ref 0.93–2.08)
RVN2 REF: 1.51
RVN2TLCN2 LLN: 24.33
RVN2TLCN2 PRE REF: 121.5 %
RVN2TLCN2 PRE: 41.21 % (ref 24.33–43.51)
RVN2TLCN2 REF: 33.92
TLCN2 LLN: 3.45
TLCN2 PRE REF: 57.9 %
TLCN2 PRE: 2.57 L (ref 3.45–5.42)
TLCN2 REF: 4.44
VA PRE: 2.49 L (ref 4.29–4.29)
VA SINGLE BREATH LLN: 4.29
VA SINGLE BREATH PRE REF: 58.1 %
VA SINGLE BREATH REF: 4.29
VCMAXN2 LLN: 2.56
VCMAXN2 PRE REF: 46.9 %
VCMAXN2 PRE: 1.51 L (ref 2.56–3.92)
VCMAXN2 REF: 3.22

## 2024-09-26 PROCEDURE — 94729 DIFFUSING CAPACITY: CPT | Mod: 26,,, | Performed by: INTERNAL MEDICINE

## 2024-09-26 PROCEDURE — 94729 DIFFUSING CAPACITY: CPT

## 2024-09-26 PROCEDURE — 94727 GAS DIL/WSHOT DETER LNG VOL: CPT

## 2024-09-26 PROCEDURE — 94727 GAS DIL/WSHOT DETER LNG VOL: CPT | Mod: 26,,, | Performed by: INTERNAL MEDICINE

## 2024-09-26 PROCEDURE — 94060 EVALUATION OF WHEEZING: CPT

## 2024-09-26 PROCEDURE — 94060 EVALUATION OF WHEEZING: CPT | Mod: 26,,, | Performed by: INTERNAL MEDICINE

## 2024-09-26 NOTE — TELEPHONE ENCOUNTER
PFT shows severe obstruction with reversal from bronchodilator, moderate restriction, moderate diffusion defect.

## 2024-09-26 NOTE — TELEPHONE ENCOUNTER
Severe obstruction is present  There was a significant response to bronchodilator.  Lung volumes show moderate restriction.  Moderate diffusion defect is present.

## 2024-09-27 RX ORDER — CITALOPRAM 40 MG/1
40 TABLET, FILM COATED ORAL
Qty: 90 TABLET | Refills: 1 | Status: SHIPPED | OUTPATIENT
Start: 2024-09-27

## 2024-09-30 ENCOUNTER — TELEPHONE (OUTPATIENT)
Dept: PULMONOLOGY | Facility: CLINIC | Age: 44
End: 2024-09-30
Payer: COMMERCIAL

## 2024-09-30 DIAGNOSIS — J44.9 CHRONIC OBSTRUCTIVE PULMONARY DISEASE, UNSPECIFIED COPD TYPE: ICD-10-CM

## 2024-09-30 DIAGNOSIS — J44.9 CHRONIC OBSTRUCTIVE PULMONARY DISEASE, UNSPECIFIED COPD TYPE: Primary | ICD-10-CM

## 2024-09-30 RX ORDER — UMECLIDINIUM BROMIDE AND VILANTEROL TRIFENATATE 62.5; 25 UG/1; UG/1
1 POWDER RESPIRATORY (INHALATION) DAILY
Qty: 1 EACH | Refills: 5 | Status: SHIPPED | OUTPATIENT
Start: 2024-09-30 | End: 2024-10-01

## 2024-09-30 NOTE — TELEPHONE ENCOUNTER
The patient's PFTs show severe obstructive lung disease.  Will start her on Anoro.  Told her we would complete a handicap license form for her.  And I completed her [employer] paperwork.

## 2024-10-01 RX ORDER — TIOTROPIUM BROMIDE AND OLODATEROL 3.124; 2.736 UG/1; UG/1
2 SPRAY, METERED RESPIRATORY (INHALATION) DAILY
Qty: 4 G | Refills: 11 | Status: SHIPPED | OUTPATIENT
Start: 2024-10-01

## 2024-10-01 NOTE — TELEPHONE ENCOUNTER
Patient's pharmacy states insurance will not pay for Anoro.  They will pay for Stiolto.  This was ordered.

## 2024-10-24 ENCOUNTER — OFFICE VISIT (OUTPATIENT)
Dept: PULMONOLOGY | Facility: CLINIC | Age: 44
End: 2024-10-24
Payer: COMMERCIAL

## 2024-10-24 VITALS
SYSTOLIC BLOOD PRESSURE: 105 MMHG | HEART RATE: 107 BPM | DIASTOLIC BLOOD PRESSURE: 75 MMHG | BODY MASS INDEX: 39.08 KG/M2 | WEIGHT: 207 LBS | OXYGEN SATURATION: 97 % | HEIGHT: 61 IN

## 2024-10-24 DIAGNOSIS — E66.9 OBESITY (BMI 35.0-39.9 WITHOUT COMORBIDITY): ICD-10-CM

## 2024-10-24 DIAGNOSIS — I27.22 PULMONARY HYPERTENSION DUE TO LEFT HEART DISEASE: ICD-10-CM

## 2024-10-24 DIAGNOSIS — G47.33 OSA (OBSTRUCTIVE SLEEP APNEA): ICD-10-CM

## 2024-10-24 DIAGNOSIS — J44.9 CHRONIC OBSTRUCTIVE PULMONARY DISEASE, UNSPECIFIED COPD TYPE: Primary | ICD-10-CM

## 2024-10-24 PROBLEM — Z72.0 TOBACCO ABUSE: Status: RESOLVED | Noted: 2020-01-20 | Resolved: 2024-10-24

## 2024-10-24 PROCEDURE — 99999 PR PBB SHADOW E&M-EST. PATIENT-LVL IV: CPT | Mod: PBBFAC,,, | Performed by: INTERNAL MEDICINE

## 2024-10-24 RX ORDER — CETIRIZINE HYDROCHLORIDE 10 MG/1
10 TABLET ORAL NIGHTLY
COMMUNITY
Start: 2024-10-02

## 2024-10-24 RX ORDER — TIRZEPATIDE 2.5 MG/.5ML
2.5 INJECTION, SOLUTION SUBCUTANEOUS
COMMUNITY
Start: 2024-09-30

## 2024-10-24 NOTE — PATIENT INSTRUCTIONS
Follow up in about 4 months (around 2/24/2025).  Continue CPAP nightly  Lose weight  Continue Anoro daily  Refilled albuterol MDI and nebulizer.  Call if you need anything  Flu shot today

## 2024-10-24 NOTE — PROGRESS NOTES
SUBJECTIVE:    Patient ID: Dakota Scanlon is a 44 y.o. female.    Chief Complaint: Follow-up      Follow-up      The patient is here with severe obstructive lung disease, moderate restrictive lung disease and a moderate diffusion.  She is off the cigarettes! She is using Anoro at this time.  She has stopped coughing and producing mucus.    Insofar as her CPAP goes, she is using it nightly, except for 2 nights she missed.  She wears it for 7 hours and 56 minutes.  She is set on 9 cm of pressure and her AHI is 1.0.  She spends 19 minutes in large leak.    Past Medical History:   Diagnosis Date    Anxiety     Arthritis     Asthma     Bipolar disorder     Endometriosis, unspecified     GERD (gastroesophageal reflux disease)     Nervous disorder     Paroxysmal atrial fibrillation     Sleep apnea     Uses CPAP     Past Surgical History:   Procedure Laterality Date    CYST REMOVAL Right     ovarie    IVUS (INTRAVASCULAR ULTRASOUND)  5/17/2024    Procedure: IVUS Lt. Leg and IVC;  Surgeon: Gabe Venegas MD;  Location: [facility];  Service: Cardiology;;    LIGATION OF VEIN Right     leg    PHLEBOGRAPHY  5/17/2024    Procedure: IVC Venogram;  Surgeon: Gabe Venegas MD;  Location: [facility];  Service: Cardiology;;    RIGHT HEART CATHETERIZATION  5/17/2024    Procedure: Right heart cath;  Surgeon: Gabe Venegas MD;  Location: [facility];  Service: Cardiology;;    VENOGRAM, LOWER EXTREMITY, UNILATERAL  5/17/2024    Procedure: Lt. Leg Venogram;  Surgeon: Gabe Venegas MD;  Location: [facility];  Service: Cardiology;;     Family History   Problem Relation Name Age of Onset    Hypertension Mother      Hypertension Father      Diabetes Father      Melanoma Father      Pacemaker/defibrilator Father      Heart disease Sister      Breast cancer Cousin          Social History:   Marital Status: Single  Occupation: works in the [employer]  Alcohol History:  reports current alcohol use.  Tobacco History:  reports that  "she quit smoking about 9 months ago. Her smoking use included cigarettes. She has never used smokeless tobacco.  Drug History:  reports no history of drug use.    Review of patient's allergies indicates:   Allergen Reactions    Amoxicillin     Depakote [divalproex] Diarrhea    Lorazepam Other (See Comments)     "they couldn't get me to wake up"    Sulfa (sulfonamide antibiotics) Other (See Comments)     Tachycardia       Current Outpatient Medications   Medication Sig Dispense Refill    albuterol (PROVENTIL) 2.5 mg /3 mL (0.083 %) nebulizer solution Take 3 mLs (2.5 mg total) by nebulization every 6 (six) hours as needed for Wheezing. Rescue 120 mL 11    albuterol (PROVENTIL/VENTOLIN HFA) 90 mcg/actuation inhaler Inhale 2 puffs into the lungs every 6 (six) hours as needed for Wheezing. Rescue 18 g 11    busPIRone (BUSPAR) 10 MG tablet TAKE 1 TABLET BY MOUTH THREE TIMES A  tablet 1    cetirizine (ZYRTEC) 10 MG tablet Take 10 mg by mouth every evening.      citalopram (CELEXA) 40 MG tablet TAKE 1 TABLET BY MOUTH EVERY DAY 90 tablet 1    clotrimazole-betamethasone 1-0.05% (LOTRISONE) cream Apply topically.      ethacrynic acid (EDECRIN) 25 mg Tab Take 25 mg by mouth once daily.      metoprolol succinate (TOPROL-XL) 50 MG 24 hr tablet Take 50 mg by mouth once daily.       midodrine (PROAMATINE) 10 MG tablet Take 10 mg by mouth once daily.       omeprazole (PRILOSEC) 40 MG capsule Take 40 mg by mouth every morning.       tiotropium-olodateroL (STIOLTO RESPIMAT) 2.5-2.5 mcg/actuation Mist Inhale 2 puffs into the lungs Daily. 4 g 11    tiZANidine (ZANAFLEX) 4 MG tablet Take 4 mg by mouth every 8 (eight) hours.      ZEPBOUND 2.5 mg/0.5 mL PnIj Inject 2.5 mg into the skin.      lamoTRIgine (LAMICTAL) 100 MG tablet Take 1 tablet (100 mg total) by mouth once daily. (Patient not taking: Reported on 5/15/2024) 30 tablet 11    loratadine (CLARITIN) 10 mg tablet Take 10 mg by mouth once daily.      sildenafil (REVATIO) 20 mg " "Tab Take 20 mg by mouth 3 (three) times daily. (Patient not taking: Reported on 9/18/2024)       No current facility-administered medications for this visit.     Facility-Administered Medications Ordered in Other Visits   Medication Dose Route Frequency Provider Last Rate Last Admin    0.9%  NaCl infusion   Intravenous Continuous Gabe Venegas MD 50 mL/hr at 05/17/24 1310 New Bag at 05/17/24 1310       Alpha-1 Antitrypsin:  Last PFT: 9/26/24  Severe obstruction is present.  FEV1 is 1.04 L  There was a significant response to bronchodilator.  Lung volumes show moderate restriction.  Moderate diffusion defect is present.  Last CT:  01/05/2022  No acute cardiac or pulmonary process    Review of Systems  General: Feeling less dyspneic.  Eyes: Vision is ok  ENT:  No sinusitis or pharyngitis.   Heart:: Has palpitations.  Lungs: she is having shortness of breath with stair climbing, cough and mucus have resolved; her breathing is much better  GI: No Nausea, vomiting, constipation, diarrhea, or reflux.  : No  nocturia  Musculoskeletal: no problems  Skin: good  Neuro: No headaches or neuropathy.  Lymph: No edema or adenopathy.  Psych: depression is doing "good"  Endo: weight stable    OBJECTIVE:      /75 (BP Location: Right arm, Patient Position: Sitting)   Pulse 107   Ht 5' 1" (1.549 m)   Wt 93.9 kg (207 lb)   SpO2 97%   BMI 39.11 kg/m²     Physical Exam  GENERAL: Midaged patient in no distress.  HEENT: Pupils equal and reactive. Extraocular movements intact. Nose intact.      Pharynx moist.  Maxillary incisors are rotten.  Mallampati 4  NECK: Supple.  16 3/4  HEART: Regular rate and rhythm. No murmur or gallop auscultated.  LUNGS: Clear to auscultation and percussion. Lung excursion symmetrical. No change in fremitus. No adventitial noises.  ABDOMEN: Bowel sounds present. Non-tender, no masses palpated.  EXTREMITIES: Normal muscle tone and joint movement, no cyanosis or clubbing.   LYMPHATICS: No adenopathy " palpated, no edema.  SKIN: Dry, intact, no lesions. Erythematous especially at the chest  NEURO: Cranial nerves II-XII intact. Motor strength 5/5 bilaterally, upper and lower extremities.  PSYCH: Appropriate affect.      Assessment:       1. Chronic obstructive pulmonary disease, unspecified COPD type    2. ENA (obstructive sleep apnea)    3. Obesity (BMI 35.0-39.9 without comorbidity)    4. Pulmonary hypertension due to left heart disease      The patient is breathing much better since she stopped smoking and is using Anoro.  She has stopped coughing.  Her coworkers have noticed.  She is on light duty at work because of the severity of her COPD.  FEV1 is 1 L. the patient's obstructive sleep apnea is being well treated with her CPAP at 9 cm.  She is quite faithful with this.  She finds great benefit from this.        Plan:       Chronic obstructive pulmonary disease, unspecified COPD type  -     influenza (Flulaval, Fluzone, Fluarix) 45 mcg/0.5 mL IM vaccine (> or = 6 mo) 0.5 mL    ENA (obstructive sleep apnea)    Obesity (BMI 35.0-39.9 without comorbidity)    Pulmonary hypertension due to left heart disease               Follow up in about 4 months (around 2/24/2025).  Continue CPAP nightly  Lose weight  Continue Anoro daily  Refilled albuterol MDI and nebulizer.  Call if you need anything  Flu shot today

## 2024-12-18 DIAGNOSIS — J44.9 CHRONIC OBSTRUCTIVE PULMONARY DISEASE, UNSPECIFIED COPD TYPE: ICD-10-CM

## 2024-12-18 RX ORDER — UMECLIDINIUM BROMIDE AND VILANTEROL TRIFENATATE 62.5; 25 UG/1; UG/1
1 POWDER RESPIRATORY (INHALATION) DAILY
Qty: 60 EACH | Refills: 6 | Status: SHIPPED | OUTPATIENT
Start: 2024-12-18

## 2024-12-18 RX ORDER — TIOTROPIUM BROMIDE AND OLODATEROL 3.124; 2.736 UG/1; UG/1
SPRAY, METERED RESPIRATORY (INHALATION)
Qty: 4 G | Refills: 4 | Status: SHIPPED | OUTPATIENT
Start: 2024-12-18 | End: 2024-12-18

## 2025-02-26 ENCOUNTER — OFFICE VISIT (OUTPATIENT)
Dept: PULMONOLOGY | Facility: CLINIC | Age: 45
End: 2025-02-26
Payer: COMMERCIAL

## 2025-02-26 VITALS
OXYGEN SATURATION: 95 % | BODY MASS INDEX: 39.08 KG/M2 | DIASTOLIC BLOOD PRESSURE: 65 MMHG | HEIGHT: 61 IN | SYSTOLIC BLOOD PRESSURE: 105 MMHG | HEART RATE: 80 BPM | WEIGHT: 207 LBS

## 2025-02-26 DIAGNOSIS — E66.9 OBESITY (BMI 35.0-39.9 WITHOUT COMORBIDITY): ICD-10-CM

## 2025-02-26 DIAGNOSIS — J44.9 CHRONIC OBSTRUCTIVE PULMONARY DISEASE, UNSPECIFIED COPD TYPE: Primary | ICD-10-CM

## 2025-02-26 DIAGNOSIS — G47.33 OSA (OBSTRUCTIVE SLEEP APNEA): ICD-10-CM

## 2025-02-26 DIAGNOSIS — I27.22 PULMONARY HYPERTENSION DUE TO LEFT HEART DISEASE: ICD-10-CM

## 2025-02-26 PROCEDURE — 99214 OFFICE O/P EST MOD 30 MIN: CPT | Mod: S$GLB,,, | Performed by: INTERNAL MEDICINE

## 2025-02-26 PROCEDURE — 99999 PR PBB SHADOW E&M-EST. PATIENT-LVL IV: CPT | Mod: PBBFAC,,, | Performed by: INTERNAL MEDICINE

## 2025-02-26 RX ORDER — SEMAGLUTIDE 0.25 MG/.5ML
INJECTION, SOLUTION SUBCUTANEOUS
COMMUNITY
Start: 2025-02-02

## 2025-02-26 NOTE — PROGRESS NOTES
SUBJECTIVE:    Patient ID: Dakota Scanlon is a 44 y.o. female.    Chief Complaint: Follow-up, Sleep Apnea, and COPD      Follow-up    COPD    The patient returns doing very well with her CPAP.  She is using it 30/30 days.  She uses it for 8 hours and 18 minutes a night.  She is set on 9 cm of CPAP in her AHI is 0.7.  Her breathing is much better.  She is much less short of breath.  She is not coughing or producing mucus.  She is working but keeping her lifting to 10 lb.  She has severe obstructive lung disease.    Past Medical History:   Diagnosis Date    Anxiety     Arthritis     Asthma     Bipolar disorder     Endometriosis, unspecified     GERD (gastroesophageal reflux disease)     Nervous disorder     Paroxysmal atrial fibrillation     Sleep apnea     Uses CPAP     Past Surgical History:   Procedure Laterality Date    CYST REMOVAL Right     ovarie    IVUS (INTRAVASCULAR ULTRASOUND)  5/17/2024    Procedure: IVUS Lt. Leg and IVC;  Surgeon: Gabe Venegas MD;  Location: [facility];  Service: Cardiology;;    LIGATION OF VEIN Right     leg    PHLEBOGRAPHY  5/17/2024    Procedure: IVC Venogram;  Surgeon: Gabe Venegas MD;  Location: [facility];  Service: Cardiology;;    RIGHT HEART CATHETERIZATION  5/17/2024    Procedure: Right heart cath;  Surgeon: Gabe Venegas MD;  Location: [facility];  Service: Cardiology;;    VENOGRAM, LOWER EXTREMITY, UNILATERAL  5/17/2024    Procedure: Lt. Leg Venogram;  Surgeon: Gabe Venegas MD;  Location: [facility];  Service: Cardiology;;     Family History   Problem Relation Name Age of Onset    Hypertension Mother      Hypertension Father      Diabetes Father      Melanoma Father      Pacemaker/defibrilator Father      Heart disease Sister      Breast cancer Cousin          Social History:   Marital Status: Single  Occupation: works in the [employer]  Alcohol History:  reports current alcohol use.  Tobacco History:  reports that she quit smoking about 13 months ago.  "Her smoking use included cigarettes. She has never used smokeless tobacco.  Drug History:  reports no history of drug use.    Review of patient's allergies indicates:   Allergen Reactions    Amoxicillin     Depakote [divalproex] Diarrhea    Lorazepam Other (See Comments)     "they couldn't get me to wake up"    Sulfa (sulfonamide antibiotics) Other (See Comments)     Tachycardia       Current Outpatient Medications   Medication Sig Dispense Refill    albuterol (PROVENTIL) 2.5 mg /3 mL (0.083 %) nebulizer solution Take 3 mLs (2.5 mg total) by nebulization every 6 (six) hours as needed for Wheezing. Rescue 120 mL 11    albuterol (PROVENTIL/VENTOLIN HFA) 90 mcg/actuation inhaler Inhale 2 puffs into the lungs every 6 (six) hours as needed for Wheezing. Rescue 18 g 11    busPIRone (BUSPAR) 10 MG tablet TAKE 1 TABLET BY MOUTH THREE TIMES A  tablet 1    cetirizine (ZYRTEC) 10 MG tablet Take 10 mg by mouth every evening.      citalopram (CELEXA) 40 MG tablet TAKE 1 TABLET BY MOUTH EVERY DAY 90 tablet 1    clotrimazole-betamethasone 1-0.05% (LOTRISONE) cream Apply topically.      ethacrynic acid (EDECRIN) 25 mg Tab Take 25 mg by mouth once daily.      loratadine (CLARITIN) 10 mg tablet Take 10 mg by mouth once daily.      metoprolol succinate (TOPROL-XL) 50 MG 24 hr tablet Take 50 mg by mouth once daily.       midodrine (PROAMATINE) 10 MG tablet Take 10 mg by mouth once daily.       omeprazole (PRILOSEC) 40 MG capsule Take 40 mg by mouth every morning.       WEGOVY 0.25 mg/0.5 mL PnIj Inject into the skin.      glycopyrrolate-formoteroL (BEVESPI AEROSPHERE) 9-4.8 mcg HFAA Inhale 2 puffs into the lungs 2 (two) times daily. Controller 10.7 g 11    lamoTRIgine (LAMICTAL) 100 MG tablet Take 1 tablet (100 mg total) by mouth once daily. (Patient not taking: Reported on 5/15/2024) 30 tablet 11    sildenafil (REVATIO) 20 mg Tab Take 20 mg by mouth 3 (three) times daily. (Patient not taking: Reported on 9/18/2024)      " "tiZANidine (ZANAFLEX) 4 MG tablet Take 4 mg by mouth every 8 (eight) hours. (Patient not taking: Reported on 2/26/2025)      ZEPBOUND 2.5 mg/0.5 mL PnIj Inject 2.5 mg into the skin.       No current facility-administered medications for this visit.     Facility-Administered Medications Ordered in Other Visits   Medication Dose Route Frequency Provider Last Rate Last Admin    0.9%  NaCl infusion   Intravenous Continuous Gabe Venegas MD 50 mL/hr at 05/17/24 1310 New Bag at 05/17/24 1310       Alpha-1 Antitrypsin:  Last PFT: 9/26/24  Severe obstruction is present.  FEV1 is 1.04 L  There was a significant response to bronchodilator.  Lung volumes show moderate restriction.  Moderate diffusion defect is present.  Last CT:  01/05/2022  No acute cardiac or pulmonary process    Review of Systems  General: Feeling less dyspneic.  Eyes: Vision is ok  ENT:  No sinusitis or pharyngitis.   Heart:: Has palpitations, acted up last weekend for 10 seconds  Lungs:  her breathing is much better  GI: No Nausea, vomiting, constipation, diarrhea, or reflux.  : No  nocturia  Musculoskeletal: no problems  Skin: good  Neuro: No headaches or neuropathy.  Lymph: No edema or adenopathy.  Psych: depression is doing "good"  Endo: weight stable    OBJECTIVE:      /65 (BP Location: Right arm, Patient Position: Sitting)   Pulse 80   Ht 5' 1" (1.549 m)   Wt 93.9 kg (207 lb)   SpO2 95%   BMI 39.11 kg/m²     Physical Exam  GENERAL: Midaged patient in no distress.  HEENT: Pupils equal and reactive. Extraocular movements intact. Nose intact.      Pharynx moist.  Maxillary incisors are rotten.  Mallampati 4  NECK: Supple.  16 3/4  HEART: Regular rate and rhythm. No murmur or gallop auscultated.  LUNGS: Clear to auscultation and percussion. Lung excursion symmetrical. No change in fremitus. No adventitial noises.  ABDOMEN: Bowel sounds present. Non-tender, no masses palpated.  EXTREMITIES: Normal muscle tone and joint movement, no " cyanosis . Positive clubbing  LYMPHATICS: No adenopathy palpated, no edema.  SKIN: Dry, intact, no lesions. Erythematous especially at the chest  NEURO: Cranial nerves II-XII intact. Motor strength 5/5 bilaterally, upper and lower extremities.  PSYCH: Appropriate affect.      Assessment:       1. Chronic obstructive pulmonary disease, unspecified COPD type    2. ENA (obstructive sleep apnea)    3. Obesity (BMI 35.0-39.9 without comorbidity)    4. Pulmonary hypertension due to left heart disease      The patient is doing much better with her breathing.  She is stayed off of cigarettes.  Her insurance will no longer pay for the Anoro.  She states they will pay for Bevespi.  We have written a script for this.  In so far as her obstructive sleep apnea she is doing quite well.  Her weight remains a problem.  Her pulmonary hypertension is left heart disease.          Plan:       Chronic obstructive pulmonary disease, unspecified COPD type  -     glycopyrrolate-formoteroL (BEVESPI AEROSPHERE) 9-4.8 mcg HFAA; Inhale 2 puffs into the lungs 2 (two) times daily. Controller  Dispense: 10.7 g; Refill: 11    ENA (obstructive sleep apnea)    Obesity (BMI 35.0-39.9 without comorbidity)    Pulmonary hypertension due to left heart disease                 Follow up in about 4 months (around 6/26/2025).  Continue CPAP nightly  Lose weight  Bevespi 2 puffs 2 x a day  Refilled albuterol MDI and nebulizer.  Call if you need anything

## 2025-05-27 DIAGNOSIS — F31.9 BIPOLAR AFFECTIVE DISORDER, REMISSION STATUS UNSPECIFIED: ICD-10-CM

## 2025-05-27 RX ORDER — CITALOPRAM 40 MG/1
40 TABLET ORAL
Qty: 90 TABLET | Refills: 1 | OUTPATIENT
Start: 2025-05-27

## 2025-05-27 NOTE — TELEPHONE ENCOUNTER
Care Due:                  Date            Visit Type   Department     Provider  --------------------------------------------------------------------------------    Last Visit: None Found      None         None Found  Next Visit: None Scheduled  None         None Found                                                            Last  Test          Frequency    Reason                     Performed    Due Date  --------------------------------------------------------------------------------    Office Visit  12 months..  busPIRone, citalopram....  Not Found    Overdue    [employer] Embedded Care Due Messages. Reference number: [id number].   5/27/2025 12:03:16 AM [facility]

## 2025-06-11 DIAGNOSIS — J44.9 CHRONIC OBSTRUCTIVE PULMONARY DISEASE, UNSPECIFIED COPD TYPE: ICD-10-CM

## 2025-06-11 RX ORDER — GLYCOPYRROLATE AND FORMOTEROL FUMARATE 9; 4.8 UG/1; UG/1
AEROSOL, METERED RESPIRATORY (INHALATION)
Qty: 10.7 G | Refills: 11 | Status: SHIPPED | OUTPATIENT
Start: 2025-06-11

## 2025-07-21 ENCOUNTER — HOSPITAL ENCOUNTER (EMERGENCY)
Facility: HOSPITAL | Age: 45
Discharge: HOME | End: 2025-07-21
Attending: EMERGENCY MEDICINE
Payer: COMMERCIAL

## 2025-07-21 VITALS
BODY MASS INDEX: 39.65 KG/M2 | WEIGHT: 210 LBS | TEMPERATURE: 99 F | OXYGEN SATURATION: 99 % | DIASTOLIC BLOOD PRESSURE: 69 MMHG | RESPIRATION RATE: 16 BRPM | HEART RATE: 80 BPM | HEIGHT: 61 IN | SYSTOLIC BLOOD PRESSURE: 134 MMHG

## 2025-07-21 DIAGNOSIS — M79.606 PAIN AND SWELLING OF LOWER EXTREMITY: ICD-10-CM

## 2025-07-21 DIAGNOSIS — S90.31XA CONTUSION OF RIGHT FOOT, INITIAL ENCOUNTER: Primary | ICD-10-CM

## 2025-07-21 DIAGNOSIS — M25.571 ACUTE RIGHT ANKLE PAIN: ICD-10-CM

## 2025-07-21 DIAGNOSIS — M79.89 PAIN AND SWELLING OF LOWER EXTREMITY: ICD-10-CM

## 2025-07-21 DIAGNOSIS — R52 PAIN: ICD-10-CM

## 2025-07-21 PROCEDURE — 36415 COLL VENOUS BLD VENIPUNCTURE: CPT | Performed by: EMERGENCY MEDICINE

## 2025-07-21 PROCEDURE — 99284 EMERGENCY DEPT VISIT MOD MDM: CPT | Mod: 25

## 2025-07-21 PROCEDURE — 87389 HIV-1 AG W/HIV-1&-2 AB AG IA: CPT | Performed by: EMERGENCY MEDICINE

## 2025-07-21 PROCEDURE — 86803 HEPATITIS C AB TEST: CPT | Performed by: EMERGENCY MEDICINE

## 2025-07-22 LAB
HCV AB SERPL QL IA: NORMAL
HIV 1+2 AB+HIV1 P24 AG SERPL QL IA: NORMAL

## 2025-07-23 ENCOUNTER — TELEPHONE (OUTPATIENT)
Dept: FAMILY MEDICINE | Facility: CLINIC | Age: 45
End: 2025-07-23
Payer: COMMERCIAL

## 2025-07-23 NOTE — TELEPHONE ENCOUNTER
----- Message from Eros Robles MD sent at 7/23/2025  8:15 AM CDT -----  Results Ok, notify patient.  ----- Message -----  From: Lab, Background User  Sent: 7/22/2025   2:45 PM CDT  To: Eros Robles MD

## 2025-07-24 LAB — HOLD SPECIMEN: NORMAL
